# Patient Record
Sex: MALE | Race: WHITE | NOT HISPANIC OR LATINO | Employment: OTHER | URBAN - METROPOLITAN AREA
[De-identification: names, ages, dates, MRNs, and addresses within clinical notes are randomized per-mention and may not be internally consistent; named-entity substitution may affect disease eponyms.]

---

## 2021-02-22 ENCOUNTER — TRANSFERRED RECORDS (OUTPATIENT)
Dept: HEALTH INFORMATION MANAGEMENT | Facility: CLINIC | Age: 81
End: 2021-02-22

## 2021-03-24 ENCOUNTER — PREP FOR PROCEDURE (OUTPATIENT)
Dept: PULMONOLOGY | Facility: CLINIC | Age: 81
End: 2021-03-24

## 2021-03-24 ENCOUNTER — TELEPHONE (OUTPATIENT)
Dept: PULMONOLOGY | Facility: CLINIC | Age: 81
End: 2021-03-24

## 2021-03-24 DIAGNOSIS — R91.8 LUNG MASS: Primary | ICD-10-CM

## 2021-03-24 NOTE — TELEPHONE ENCOUNTER
Spoke with patient to schedule procedure with Jose Roberto Resendiz    Procedure was scheduled on 03/25 at [facility] OR  Patient will have H&P with [facility]     Patient is aware a COVID-19 test is needed before their procedure. The test should be with-in 4 days of their procedure.   Test Details: Date 03/24 Location [facility]     Patient is aware a / is needed day of surgery.   Patient has my direct contact information for any further questions.

## 2021-03-25 ENCOUNTER — HOSPITAL ENCOUNTER (OUTPATIENT)
Facility: CLINIC | Age: 81
Discharge: HOME | End: 2021-03-25
Attending: INTERNAL MEDICINE | Admitting: INTERNAL MEDICINE
Payer: COMMERCIAL

## 2021-03-25 ENCOUNTER — ANESTHESIA EVENT (OUTPATIENT)
Dept: SURGERY | Facility: CLINIC | Age: 81
End: 2021-03-25
Payer: COMMERCIAL

## 2021-03-25 ENCOUNTER — ANESTHESIA (OUTPATIENT)
Dept: SURGERY | Facility: CLINIC | Age: 81
End: 2021-03-25
Payer: COMMERCIAL

## 2021-03-25 VITALS
DIASTOLIC BLOOD PRESSURE: 81 MMHG | SYSTOLIC BLOOD PRESSURE: 122 MMHG | BODY MASS INDEX: 31.48 KG/M2 | RESPIRATION RATE: 16 BRPM | HEIGHT: 71 IN | OXYGEN SATURATION: 97 % | TEMPERATURE: 98.4 F | WEIGHT: 224.87 LBS | HEART RATE: 92 BPM

## 2021-03-25 DIAGNOSIS — R91.8 LUNG MASS: Primary | ICD-10-CM

## 2021-03-25 DIAGNOSIS — R91.8 LUNG MASS: ICD-10-CM

## 2021-03-25 LAB
ALBUMIN SERPL-MCNC: 3.6 G/DL (ref 3.4–5)
ALP SERPL-CCNC: 56 U/L (ref 40–150)
ALT SERPL W P-5'-P-CCNC: 25 U/L (ref 0–70)
AST SERPL W P-5'-P-CCNC: 16 U/L (ref 0–45)
BILIRUB DIRECT SERPL-MCNC: 0.2 MG/DL (ref 0–0.2)
BILIRUB SERPL-MCNC: 0.7 MG/DL (ref 0.2–1.3)
CREAT SERPL-MCNC: 0.91 MG/DL (ref 0.66–1.25)
ERYTHROCYTE [DISTWIDTH] IN BLOOD BY AUTOMATED COUNT: 13.8 % (ref 10–15)
GFR SERPL CREATININE-BSD FRML MDRD: 79 ML/MIN/{1.73_M2}
GLUCOSE BLDC GLUCOMTR-MCNC: 96 MG/DL (ref 70–99)
HCT VFR BLD AUTO: 41.2 % (ref 40–53)
HGB BLD-MCNC: 13 G/DL (ref 13.3–17.7)
INR PPP: 1.1 (ref 0.86–1.14)
INTERPRETATION ECG - MUSE: NORMAL
MCH RBC QN AUTO: 30.5 PG (ref 26.5–33)
MCHC RBC AUTO-ENTMCNC: 31.6 G/DL (ref 31.5–36.5)
MCV RBC AUTO: 97 FL (ref 78–100)
PLATELET # BLD AUTO: 148 10E9/L (ref 150–450)
POTASSIUM SERPL-SCNC: 4.2 MMOL/L (ref 3.4–5.3)
PROT SERPL-MCNC: 6.9 G/DL (ref 6.8–8.8)
RBC # BLD AUTO: 4.26 10E12/L (ref 4.4–5.9)
TSH SERPL DL<=0.005 MIU/L-ACNC: 2.73 MU/L (ref 0.4–4)
WBC # BLD AUTO: 5 10E9/L (ref 4–11)

## 2021-03-25 PROCEDURE — 85027 COMPLETE CBC AUTOMATED: CPT | Performed by: INTERNAL MEDICINE

## 2021-03-25 PROCEDURE — 36415 COLL VENOUS BLD VENIPUNCTURE: CPT | Performed by: INTERNAL MEDICINE

## 2021-03-25 PROCEDURE — 93005 ELECTROCARDIOGRAM TRACING: CPT

## 2021-03-25 PROCEDURE — 36415 COLL VENOUS BLD VENIPUNCTURE: CPT | Performed by: ANESTHESIOLOGY

## 2021-03-25 PROCEDURE — 999N000005 HC CANCELLED SURGERY UP TO 61-90 MINS: Performed by: INTERNAL MEDICINE

## 2021-03-25 PROCEDURE — 93010 ELECTROCARDIOGRAM REPORT: CPT | Mod: 59 | Performed by: INTERNAL MEDICINE

## 2021-03-25 PROCEDURE — 80076 HEPATIC FUNCTION PANEL: CPT | Performed by: INTERNAL MEDICINE

## 2021-03-25 PROCEDURE — 999N000141 HC STATISTIC PRE-PROCEDURE NURSING ASSESSMENT: Performed by: INTERNAL MEDICINE

## 2021-03-25 PROCEDURE — 82962 GLUCOSE BLOOD TEST: CPT

## 2021-03-25 PROCEDURE — 85610 PROTHROMBIN TIME: CPT | Performed by: ANESTHESIOLOGY

## 2021-03-25 PROCEDURE — 99222 1ST HOSP IP/OBS MODERATE 55: CPT | Mod: 25 | Performed by: INTERNAL MEDICINE

## 2021-03-25 PROCEDURE — 84443 ASSAY THYROID STIM HORMONE: CPT | Performed by: INTERNAL MEDICINE

## 2021-03-25 PROCEDURE — 84132 ASSAY OF SERUM POTASSIUM: CPT | Performed by: ANESTHESIOLOGY

## 2021-03-25 PROCEDURE — 82565 ASSAY OF CREATININE: CPT | Performed by: ANESTHESIOLOGY

## 2021-03-25 RX ORDER — CIPROFLOXACIN 250 MG/1
TABLET, FILM COATED ORAL
Status: ON HOLD | COMMUNITY
Start: 2020-11-16 | End: 2021-03-25

## 2021-03-25 RX ORDER — METOPROLOL TARTRATE 50 MG
75 TABLET ORAL
COMMUNITY
End: 2021-09-13

## 2021-03-25 RX ORDER — LIDOCAINE 50 MG/G
PATCH TOPICAL
Status: ON HOLD | COMMUNITY
Start: 2021-01-29 | End: 2023-01-01

## 2021-03-25 RX ORDER — SODIUM CHLORIDE, SODIUM LACTATE, POTASSIUM CHLORIDE, CALCIUM CHLORIDE 600; 310; 30; 20 MG/100ML; MG/100ML; MG/100ML; MG/100ML
INJECTION, SOLUTION INTRAVENOUS CONTINUOUS
Status: CANCELLED | OUTPATIENT
Start: 2021-03-25

## 2021-03-25 RX ORDER — CHLORAL HYDRATE 500 MG
1 CAPSULE ORAL DAILY
COMMUNITY

## 2021-03-25 RX ORDER — ONDANSETRON 2 MG/ML
4 INJECTION INTRAMUSCULAR; INTRAVENOUS EVERY 30 MIN PRN
Status: CANCELLED | OUTPATIENT
Start: 2021-03-25

## 2021-03-25 RX ORDER — OXYBUTYNIN CHLORIDE 5 MG/1
2.5 TABLET ORAL
COMMUNITY
Start: 2020-06-01 | End: 2021-09-13

## 2021-03-25 RX ORDER — NALOXONE HYDROCHLORIDE 0.4 MG/ML
0.4 INJECTION, SOLUTION INTRAMUSCULAR; INTRAVENOUS; SUBCUTANEOUS
Status: CANCELLED | OUTPATIENT
Start: 2021-03-25

## 2021-03-25 RX ORDER — ZINC GLUCONATE 50 MG
50 TABLET ORAL DAILY
COMMUNITY

## 2021-03-25 RX ORDER — NALOXONE HYDROCHLORIDE 0.4 MG/ML
0.2 INJECTION, SOLUTION INTRAMUSCULAR; INTRAVENOUS; SUBCUTANEOUS
Status: CANCELLED | OUTPATIENT
Start: 2021-03-25

## 2021-03-25 RX ORDER — LANOLIN ALCOHOL/MO/W.PET/CERES
400 CREAM (GRAM) TOPICAL DAILY
COMMUNITY

## 2021-03-25 RX ORDER — FOLIC ACID 0.8 MG
500 TABLET ORAL
Status: ON HOLD | COMMUNITY
End: 2023-01-01

## 2021-03-25 RX ORDER — OXYCODONE HYDROCHLORIDE 5 MG/1
TABLET ORAL
COMMUNITY
Start: 2021-03-12 | End: 2021-09-13

## 2021-03-25 RX ORDER — HYDROMORPHONE HYDROCHLORIDE 1 MG/ML
.3-.5 INJECTION, SOLUTION INTRAMUSCULAR; INTRAVENOUS; SUBCUTANEOUS EVERY 5 MIN PRN
Status: CANCELLED | OUTPATIENT
Start: 2021-03-25

## 2021-03-25 RX ORDER — ONDANSETRON 4 MG/1
4 TABLET, ORALLY DISINTEGRATING ORAL EVERY 30 MIN PRN
Status: CANCELLED | OUTPATIENT
Start: 2021-03-25

## 2021-03-25 RX ORDER — HYDRALAZINE HYDROCHLORIDE 20 MG/ML
2.5-5 INJECTION INTRAMUSCULAR; INTRAVENOUS EVERY 10 MIN PRN
Status: CANCELLED | OUTPATIENT
Start: 2021-03-25

## 2021-03-25 RX ORDER — MORPHINE SULFATE 15 MG/1
15 TABLET ORAL EVERY 4 HOURS PRN
Status: ON HOLD | COMMUNITY
End: 2021-03-31

## 2021-03-25 RX ORDER — SODIUM CHLORIDE, SODIUM LACTATE, POTASSIUM CHLORIDE, CALCIUM CHLORIDE 600; 310; 30; 20 MG/100ML; MG/100ML; MG/100ML; MG/100ML
INJECTION, SOLUTION INTRAVENOUS CONTINUOUS
Status: DISCONTINUED | OUTPATIENT
Start: 2021-03-25 | End: 2021-03-25 | Stop reason: HOSPADM

## 2021-03-25 RX ORDER — TAMSULOSIN HYDROCHLORIDE 0.4 MG/1
0.4 CAPSULE ORAL EVERY EVENING
COMMUNITY
Start: 2020-10-15

## 2021-03-25 RX ORDER — FENTANYL CITRATE 50 UG/ML
25-50 INJECTION, SOLUTION INTRAMUSCULAR; INTRAVENOUS
Status: CANCELLED | OUTPATIENT
Start: 2021-03-25

## 2021-03-25 RX ORDER — ALBUTEROL SULFATE 90 UG/1
2 AEROSOL, METERED RESPIRATORY (INHALATION)
COMMUNITY

## 2021-03-25 ASSESSMENT — ENCOUNTER SYMPTOMS: DYSRHYTHMIAS: 1

## 2021-03-25 ASSESSMENT — MIFFLIN-ST. JEOR: SCORE: 1747.13

## 2021-03-25 NOTE — OR NURSING
Dr Sariah Talbot -fellow from service in with patient with new onset atrial fib.and Dr Hyatt from anesthesia in to assess. Dr Hyatt is going to talk with Dr Mckeon and cardiology.

## 2021-03-25 NOTE — OR NURSING
Dr. FIDENCIO Rebollar (Cardiology)here with Pt and wife. She has reviewed all the lab results for labs that she had ordered.

## 2021-03-25 NOTE — OR NURSING
Dr. FIDENCIO Rebollar and Dr. SHAUNNA Herzog here to speak with Pt and wife. They have decided that Pt does not need the cardiac echo today.  Pt and wife understand that Dr. Resendiz's clinic will call them to reschedule.  Procedure scheduled for today was cancelled by anesthesia per Dr. Resendiz.  Face to face time 120 min.

## 2021-03-25 NOTE — PROGRESS NOTES
Case cancelled by anesthesia service due to new atrial fibrillation.    They have consulted cardiology.  Further cares in pre-op per cardiology and anesthesia service.    We will reschedule his procedure.    Jose Roberto Resendiz MD

## 2021-03-25 NOTE — OR NURSING
Pt took one oxycodone 5mg from his own supply with the approval of Dr. Diego.  Awaiting cardiology consult.

## 2021-03-25 NOTE — OR NURSING
Dr. Mcdonnell was informed of the decision by cardiology and that they have cleared Pt to be discharged to home. Pt and wife are comfortable with the information.

## 2021-03-25 NOTE — CONSULTS
Cardiology Consult                                                               2021  Mulugeta Castillo MRN: [id number]  Age: 80 year old, : 1940      Reason for consult:      Pre-operative, incidentally noted Afib (concern for new onset).      Assessment and Recommendation:     Mr Castillo is an 80 year old  with past medical history of hypertension, prior PE () on short term Coumadin, prior smoking, aortic root dilation (4.2 cm), chronic left chest wall pain attributed to pleurisy with recently diagnosed left hilar lung mass who presented today for an elective EBUS + biopsy of lung mass. Pre-operatively, patient noted to be in Afib on telemetry following which cardiology was consulted.     On review of records, patient noted to have a diagnosis of Afib in  ([facility]), however, patient unaware of such a diagnosis, no recurrent mention of Afib in subsequent follow ups. On assessment today, patient unaware of irregular rhythm, denied palpitations/SOB/presyncope. Rate controlled with HR /min, hemodynamically stable. Review of OSH TTE from Dec 2019 revealed normal LV function, trace MR, mildly enlarged LA.    # Paroxysmal Afib  # Hypertension  # Newly diagnosed left hilar mass    Plan:   Rate/rhythm control:   Patient on a regimen of metoprolol tartrate 75 mg BID, apparently for BP control. Good resultant rate control. Will not target rhythm control given long standing nature of paroxysmal Afib and asymptomatic state.    Anti-coagulation: CHADS VASc 3 (++age +HTN) translating into a ~3.2% annual risk of stroke. Risk vs benefit of AC discussed with the patient, decided to hold off for now given upcoming procedure and possible risk of bleeding thereafter w/ thrombocytopenia usually associated with chemoradiotherapy. Defer anti-coagulation initiation to PCP/oncologist once the treatment plan for lung mass is finalized.    - Repeat transthoracic echocardiogram pre-procedure not  warranted given long standing nature of Afib and recent TTE in Dec 2019 with preserved LV function/lack of valvular abnormalities. 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       Patient discussed with staff attending, Dr. Herzog and the note reflects our joint plan. Thank you for consulting the cardiovascular services at the [facility]. Please do not hesitate to call with questions or concerns.     Melissa Rebollar MD,   Cardiovascular Disease Fellow  Pager [phone]      History of Present Illness:     Mr Castillo is an 80 year old  with past medical history of hypertension, prior PE (2015) on short term Coumadin, prior smoking, aortic root dilation (4.2 cm), chronic left chest wall pain attributed to pleurisy with recently diagnosed left hilar lung mass who presented today for an elective EBUS + biopsy of lung mass. Pre-operatively, patient noted to be in Afib on telemetry following which cardiology was consulted.     On assessment, the patient denied hx of palpitations/SOB/MANZANO/ presyncope or syncope. Reported sharp left chest wall pain (reproducible with palpation) but denied angina. Denied PND, orthopnea, reported trace pedal edema which resolves with walking. Reported fatigue 2/2 poor sleep due to severe/sharp chest pain for which he recently started opioids.     On review of records, the patient was noted to have a diagnosis of Afib in 2008 ([facility]), however, he denied being aware of such a diagnosis. No recurrent mention of Afib in subsequent notes/follow ups. He denied being on anti-coagulation except short term coumadin for PE. Reported taking Lopressor 75 BID which was started about 5 years ago for BP control. He denied hx of GI bleeding, recurrent falls.     Vitals: HR /min, paroxysmal Afib. -120/70-80 mm Hg  Labs: Normal WBC,  HGB 13, PLT 148k, normal renal/liver function. Normal thyroid function. INR 1.1    Current cardiac medications: Lopressor 75 BID, ASA 81 mg daily.  Vitals:    03/25/21 1119   Weight: 102 kg (224 lb 13.9 oz)     A 13-point ROS is negative except as mentioned above     Meds:   omeprazole (PRILOSEC) 40 MG delayed-release capsule   Take 40 mg by mouth one time a day. Take before meals. Do not crush.   0     Active   Magnesium Oxide 500 MG Cap   Take 500 mg by mouth two times a day.   0     Active   OYSTER SHELL CALCIUM OR    Indications: 2 tabs in AM Take by mouth. Indications: 2 tabs in AM   0     Active   folic acid 400 MCG tablet   Take 400 mcg by mouth one time a day.   0     Active   vitamin B-6 (PYRIDOXINE) 100 MG tablet   Take 100 mg by mouth one time a day.   0     Active   Garlic 1000 MG Cap   Take by mouth.   0     Active   Omega-3 Fatty Acids (FISH OIL) 500 MG Cap   Take by mouth.   0     Active   vitamin E 400 UNIT capsule   Take 400 Units by mouth one time a day.   0     Active   aspirin 81 MG tablet   Take 81 mg by mouth one time a day.   0     Active   Zinc 50 MG Cap   Take 100 mg by mouth every morning.   0     Active   HYDROcodone-acetaminophen (NORCO) 5-325 MG oral tablet   Take 1-2 Tabs by mouth every four hours as needed for Pain. Limit acetaminophen to 4000 mg per day from all sources. 60 Tab   0 07/07/2015   Active   metoprolol tartrate (LOPRESSOR) 50 MG tablet   Take 1.5 Tabs by mouth two times a day. 30 Tab                Cardiac Tests:     TTE 12/31/2019:   1. Normal LV size, mildly increased wall thickness, normal global systolic function with an estimated EF of 70 - 75%.   2. Findings suggestive of possible acute PE. Reddy's sign noted.   3. Right ventricular cavity size is moderately enlarged, global systolic RV function is borderline reduced.   4. The aortic valve is calcified, no stenosis and no regurgitation.   5. The mitral valve is sclerotic, trace mitral regurgitation.   6. Moderate  tricuspid regurgitation.   7. Pulmonary artery is mildly dilated.   8. The inferior vena cava is dilated, respiratory size variation greater than 50%.   9. The ascending aorta is dilated with a maximal diameter of 5.0 cm.  10. Severely increased estimated pulmonary pressures by tricuspid regurgitation velocity and right atrial pressure (54 mmHg plus RAP).  11. The aortic sinus is dilated with a maximal diameter of 4.2 cm.  12. Mildly enlarged left atrium.    Lexiscan 1/20/2020:  1. Adequate pharmacologic stress test with regadenoson (Lexiscan).  2. The pharmacological stress ECG was negative for ischemia.  3. Myocardial perfusion was normal.   4. There was normal myocardial thickening and wall motion.  5. The resting LVEF was calculated to be 67%.   6. Left ventricular cavity size was normal  (resting  ml).  7. Compared to the prior study from 2/9/12, the previous inferior wall   reversibility is no longer seen.    CTA: 3/21/2021    FINDINGS: There is a good contrast bolus within the pulmonary arterial system.  There is no filling defect identified. There is a prominent left hilar mass with  extensive exhibits mild narrowing of the left upper lobe arterial vasculature.  There is atelectasis and/or scarring in the periphery of the lung multiple  lobes. There is a 4.1 x 4.9 cm left hilar mass noted. There is no acute  pulmonary disease. The trachea and proximal bronchi are patent. There is trace  left pleural fluid. There is no pericardial or right pleural effusion. There is  no thoracic adenopathy. There are gallstones in the gallbladder. There is a  small nonobstructing right renal collecting system stone. There are multiple  hypodensities within the liver likely representing cysts. There is no upper  abdominal adenopathy. There is arthritic change of the visualized skeleton.  There is no worrisome bone lesion.    IMPRESSION:  1.  Negative for acute pulmonary embolism.   2.  Left hilar lung mass.  3.   Cholelithiasis    STUDY:  Coronary CTA, 08/11/08  Coronary Anatomy:  (Right Dominant)    Left Main:  No stenoses.  Left Anterior Descending:  No stenoses.  First Diagonal:   No stenoses.  Second Diagonal:  Tiny, no stenoses.  Circumflex:  (Nondominant)  Intermediate Artery:  No stenoses.  First Obtuse Marginal:   No stenoses.  Right Coronary Artery:  Small but dominant.  No stenoses.  Posterior Descending Artery:   No stenoses.  Posterolateral Branch:   No stenoses.    Aorta:  Aortic sinus diameter 3.9 cm. Aortic valve is trileaflet. There   is preservation of the sinotubular ridge. The maximum ascending aortic   size is 5.0 x 5.1 cm.   Pericardium:  Normal thickness and without an effusion.       Past Medical History:     Patient Active Problem List   Diagnosis     Lung mass         Past Surgical History:      History reviewed. No pertinent surgical history.      Social History:     Social History     Socioeconomic History     Marital status:      Spouse name: Not on file     Number of children: Not on file     Years of education: Not on file     Highest education level: Not on file   Occupational History     Not on file   Social Needs     Financial resource strain: Not on file     Food insecurity     Worry: Not on file     Inability: Not on file     Transportation needs     Medical: Not on file     Non-medical: Not on file   Tobacco Use     Smoking status: Not on file   Substance and Sexual Activity     Alcohol use: Not on file     Drug use: Not on file     Sexual activity: Not on file   Lifestyle     Physical activity     Days per week: Not on file     Minutes per session: Not on file     Stress: Not on file   Relationships     Social connections     Talks on phone: Not on file     Gets together: Not on file     Attends [employer] service: Not on file     Active member of club or organization: Not on file     Attends meetings of clubs or organizations: Not on file     Relationship status: Not on file      Intimate partner violence     Fear of current or ex partner: Not on file     Emotionally abused: Not on file     Physically abused: Not on file     Forced sexual activity: Not on file   Other Topics Concern     Not on file   Social History Narrative     Not on file         Family History:     History reviewed. No pertinent family history.      Allergies:     Allergies   Allergen Reactions     Diltiazem Rash     Other reaction(s): Blistering rash  Skin peeling  Other reaction(s): Blistering rash  Skin peeling       Doxycycline Unknown and Rash     Rapid heart beat ????  Rapid heart beat ????       Tetracycline Rash         Medications:     No current facility-administered medications on file prior to encounter.   albuterol (PROAIR HFA/PROVENTIL HFA/VENTOLIN HFA) 108 (90 Base) MCG/ACT inhaler, Inhale 2 puffs into the lungs  Carboxymethylcellul-Glycerin 1-0.9 % GEL, Apply 1 drop to eye  lidocaine (LIDODERM) 5 % patch, APPLY 1 PATCH TO CHEST WALL EVERY DAY FOR PAIN RELIEF -WEAR FOR ONLY 12 HOURS THEN REMOVE AND LEAVE OFF FOR 12 HOURS  metoprolol tartrate (LOPRESSOR) 50 MG tablet, Take 75 mg by mouth  morphine (MSIR) 15 MG IR tablet, Take 15 mg by mouth every 4 hours as needed for severe pain  omeprazole (PRILOSEC) 20 MG DR capsule, Take 20 mg by mouth  oxybutynin (DITROPAN) 5 MG tablet, Take 2.5 mg by mouth  oxyCODONE (ROXICODONE) 5 MG tablet, TAKE ONE TABLET BY MOUTH EVERY 6 HOURS FOR PAIN  polyvinyl alcohol-povidone PF (REFRESH) 1.4-0.6 % ophthalmic solution, Apply 1 drop to eye  tamsulosin (FLOMAX) 0.4 MG capsule, TAKE ONE CAPSULE BY MOUTH EVERY DAY FOR BLADDER EMPTYING  zinc gluconate 50 MG tablet, Take 50 mg by mouth  Aspirin Buf,CaCarb-MgCarb-MgO, 81 MG TABS, Take 81 mg by mouth  Calcium Carb-Cholecalciferol 500-400 MG-UNT/5ML LIQD, Take 500 mg by mouth  folic acid (FOLVITE) 400 MCG tablet, Take 400 mcg by mouth  Garlic Oil 1000 MG CAPS,   Magnesium 500 MG CAPS, Take 500 mg by mouth  Omega-3 Fatty Acids (FISH OIL)  "500 MG CAPS,             Physical Exam:     /83 (BP Location: Left arm)   Pulse 85   Temp 98.4  F (36.9  C) (Oral)   Resp 14   Ht 1.803 m (5' 11\")   Wt 102 kg (224 lb 13.9 oz)   BMI 31.36 kg/m      Wt Readings from Last 4 Encounters:   03/25/21 102 kg (224 lb 13.9 oz)       No intake or output data in the 24 hours ending 03/25/21 1437    Gen: AA&Ox3, no acute distress  HEENT: EOM grossly intact, mucous membranes pink, moist without plaque or exudate  PULM/THORAX: Clear to auscultation bilaterally, no rales/rhonchi/wheezes. Severely tender left mid- chest wall.  CV: Irregular, S1 and S2 appreciated, no extra heart sounds, murmurs or rub auscultated. No JVD  ABD: obese, soft, nontender, nondistended. Normoactive bowel sounds  EXT: Trace B/L edema, clubbing or cyanosis.   PSYCH: appropriate affect and mentation.       Data:     Labs Reviewed on Admission    Troponin No results found for: TROPI, TROPONIN, TROPR, TROPN  BMP  Recent Labs   Lab 03/25/21  1138   POTASSIUM 4.2   CR 0.91     CBCNo lab results found in last 7 days.  INR  Recent Labs   Lab 03/25/21  1138   INR 1.10      Hepatic Panel No results found for: AST  No results found for: ALT  No results found for: BILICONJ   No results found for: BILITOTAL  No results found for: ALBUMIN  No results found for: PROTTOTAL   No results found for: ALKPHOS       "

## 2021-03-25 NOTE — ANESTHESIA PREPROCEDURE EVALUATION
Anesthesia Pre-Procedure Evaluation    Patient: Mulugeta Castillo   MRN: [id number] : 1940        Preoperative Diagnosis: Lung mass [R91.8]   Procedure : Procedure(s):  Veran BRONCHOSCOPY, USING OPTICAL TRACKING SYSTEM,  endobronchial ultrasound and transbronchial biopsies     No past medical history on file.   History reviewed. No pertinent surgical history.   Allergies   Allergen Reactions     Diltiazem Rash     Other reaction(s): Blistering rash  Skin peeling  Other reaction(s): Blistering rash  Skin peeling       Doxycycline Unknown and Rash     Rapid heart beat ????  Rapid heart beat ????       Tetracycline Rash      Social History     Tobacco Use     Smoking status: Not on file   Substance Use Topics     Alcohol use: Not on file      Wt Readings from Last 1 Encounters:   21 102 kg (224 lb 13.9 oz)        Anesthesia Evaluation   Pt has had prior anesthetic. Type: General.        ROS/MED HX  ENT/Pulmonary: Comment: Lung mass  Hx PE's      Neurologic:       Cardiovascular: Comment: EKG - Afib    Aortic aneurysm     (+) hypertension-----dysrhythmias, a-fib,     METS/Exercise Tolerance:     Hematologic:       Musculoskeletal:       GI/Hepatic:     (+) GERD,     Renal/Genitourinary:       Endo:       Psychiatric/Substance Use:       Infectious Disease:       Malignancy:   (+) Malignancy, History of Prostate.    Other:               OUTSIDE LABS:  CBC: No results found for: WBC, HGB, HCT, PLT  BMP:   Lab Results   Component Value Date    POTASSIUM 4.2 2021    CR 0.91 2021     COAGS:   Lab Results   Component Value Date    INR 1.10 2021     POC:   Lab Results   Component Value Date    BGM 96 2021     HEPATIC: No results found for: ALBUMIN, PROTTOTAL, ALT, AST, GGT, ALKPHOS, BILITOTAL, BILIDIRECT, BALAJI  OTHER: No results found for: PH, LACT, A1C, NUPUR, PHOS, MAG, LIPASE, AMYLASE, TSH, T4, T3, CRP, SED    Anesthesia Plan    ASA Status:  3      Anesthesia Type: General.     - Airway: ETT    Induction: Intravenous.   Maintenance: TIVA.        Consents    Anesthesia Plan(s) and associated risks, benefits, and realistic alternatives discussed. Questions answered and patient/representative(s) expressed understanding.     - Discussed with:  Patient      - Extended Intubation/Ventilatory Support Discussed: No.      - Patient is DNR/DNI Status: No    Use of blood products discussed: No .     Postoperative Care    Pain management: IV analgesics, Oral pain medications.   PONV prophylaxis: Ondansetron (or other 5HT-3), Dexamethasone or Solumedrol     Comments:                Aj Hyatt MD

## 2021-03-26 ENCOUNTER — TELEPHONE (OUTPATIENT)
Dept: PULMONOLOGY | Facility: CLINIC | Age: 81
End: 2021-03-26

## 2021-03-26 DIAGNOSIS — Z11.59 ENCOUNTER FOR SCREENING FOR OTHER VIRAL DISEASES: ICD-10-CM

## 2021-03-26 NOTE — TELEPHONE ENCOUNTER
Spoke with patient and wife to schedule procedure with Dr. Hema Barajas   Procedure was scheduled on 03/31 at [facility] OR  [facility]  Patient had cardiac consult on 03/25 as well     Patient is aware a COVID-19 test is needed before their procedure. The test should be with-in 4 days of their procedure.   Test Details: Date 03/29  Location [facility]    Patient is aware a / is needed day of surgery.   Patient has my direct contact information for any further questions.

## 2021-03-29 ENCOUNTER — TRANSFERRED RECORDS (OUTPATIENT)
Dept: HEALTH INFORMATION MANAGEMENT | Facility: CLINIC | Age: 81
End: 2021-03-29

## 2021-03-30 ENCOUNTER — TRANSFERRED RECORDS (OUTPATIENT)
Dept: HEALTH INFORMATION MANAGEMENT | Facility: CLINIC | Age: 81
End: 2021-03-30

## 2021-03-31 ENCOUNTER — ANESTHESIA EVENT (OUTPATIENT)
Dept: SURGERY | Facility: CLINIC | Age: 81
End: 2021-03-31
Payer: COMMERCIAL

## 2021-03-31 ENCOUNTER — APPOINTMENT (OUTPATIENT)
Dept: GENERAL RADIOLOGY | Facility: CLINIC | Age: 81
End: 2021-03-31
Attending: INTERNAL MEDICINE
Payer: COMMERCIAL

## 2021-03-31 ENCOUNTER — HOSPITAL ENCOUNTER (OUTPATIENT)
Facility: CLINIC | Age: 81
Discharge: HOME | End: 2021-03-31
Attending: INTERNAL MEDICINE | Admitting: INTERNAL MEDICINE
Payer: COMMERCIAL

## 2021-03-31 ENCOUNTER — ANESTHESIA (OUTPATIENT)
Dept: SURGERY | Facility: CLINIC | Age: 81
End: 2021-03-31
Payer: COMMERCIAL

## 2021-03-31 ENCOUNTER — HOSPITAL ENCOUNTER (OUTPATIENT)
Dept: CT IMAGING | Facility: CLINIC | Age: 81
End: 2021-03-31
Attending: STUDENT IN AN ORGANIZED HEALTH CARE EDUCATION/TRAINING PROGRAM | Admitting: INTERNAL MEDICINE
Payer: COMMERCIAL

## 2021-03-31 VITALS
OXYGEN SATURATION: 94 % | HEIGHT: 71 IN | TEMPERATURE: 98 F | HEART RATE: 88 BPM | SYSTOLIC BLOOD PRESSURE: 108 MMHG | BODY MASS INDEX: 30.49 KG/M2 | RESPIRATION RATE: 18 BRPM | DIASTOLIC BLOOD PRESSURE: 75 MMHG | WEIGHT: 217.81 LBS

## 2021-03-31 DIAGNOSIS — R91.8 LUNG MASS: ICD-10-CM

## 2021-03-31 LAB — GLUCOSE BLDC GLUCOMTR-MCNC: 113 MG/DL (ref 70–99)

## 2021-03-31 PROCEDURE — 88341 IMHCHEM/IMCYTCHM EA ADD ANTB: CPT | Mod: TC | Performed by: INTERNAL MEDICINE

## 2021-03-31 PROCEDURE — 88305 TISSUE EXAM BY PATHOLOGIST: CPT | Mod: 26 | Performed by: PATHOLOGY

## 2021-03-31 PROCEDURE — 250N000009 HC RX 250: Performed by: NURSE ANESTHETIST, CERTIFIED REGISTERED

## 2021-03-31 PROCEDURE — 88342 IMHCHEM/IMCYTCHM 1ST ANTB: CPT | Mod: 26 | Performed by: PATHOLOGY

## 2021-03-31 PROCEDURE — 999N001018 HC STATISTIC H-CELL BLOCK W/STAIN: Performed by: INTERNAL MEDICINE

## 2021-03-31 PROCEDURE — 88341 IMHCHEM/IMCYTCHM EA ADD ANTB: CPT | Mod: 26 | Performed by: PATHOLOGY

## 2021-03-31 PROCEDURE — 88173 CYTOPATH EVAL FNA REPORT: CPT | Mod: TC | Performed by: INTERNAL MEDICINE

## 2021-03-31 PROCEDURE — 272N000001 HC OR GENERAL SUPPLY STERILE: Performed by: INTERNAL MEDICINE

## 2021-03-31 PROCEDURE — 93005 ELECTROCARDIOGRAM TRACING: CPT | Mod: XU

## 2021-03-31 PROCEDURE — 258N000003 HC RX IP 258 OP 636

## 2021-03-31 PROCEDURE — 250N000011 HC RX IP 250 OP 636

## 2021-03-31 PROCEDURE — 88172 CYTP DX EVAL FNA 1ST EA SITE: CPT | Mod: TC | Performed by: INTERNAL MEDICINE

## 2021-03-31 PROCEDURE — 71250 CT THORAX DX C-: CPT

## 2021-03-31 PROCEDURE — 71045 X-RAY EXAM CHEST 1 VIEW: CPT | Mod: 26 | Performed by: RADIOLOGY

## 2021-03-31 PROCEDURE — 88342 IMHCHEM/IMCYTCHM 1ST ANTB: CPT | Mod: TC | Performed by: INTERNAL MEDICINE

## 2021-03-31 PROCEDURE — 250N000011 HC RX IP 250 OP 636: Performed by: ANESTHESIOLOGY

## 2021-03-31 PROCEDURE — 999N000065 XR CHEST PORT 1 VW

## 2021-03-31 PROCEDURE — 250N000009 HC RX 250

## 2021-03-31 PROCEDURE — 88173 CYTOPATH EVAL FNA REPORT: CPT | Mod: 26 | Performed by: PATHOLOGY

## 2021-03-31 PROCEDURE — 258N000003 HC RX IP 258 OP 636: Performed by: NURSE ANESTHETIST, CERTIFIED REGISTERED

## 2021-03-31 PROCEDURE — 370N000017 HC ANESTHESIA TECHNICAL FEE, PER MIN: Performed by: INTERNAL MEDICINE

## 2021-03-31 PROCEDURE — 88305 TISSUE EXAM BY PATHOLOGIST: CPT | Mod: TC | Performed by: INTERNAL MEDICINE

## 2021-03-31 PROCEDURE — 82962 GLUCOSE BLOOD TEST: CPT

## 2021-03-31 PROCEDURE — 999N000141 HC STATISTIC PRE-PROCEDURE NURSING ASSESSMENT: Performed by: INTERNAL MEDICINE

## 2021-03-31 PROCEDURE — 88172 CYTP DX EVAL FNA 1ST EA SITE: CPT | Mod: 26 | Performed by: PATHOLOGY

## 2021-03-31 PROCEDURE — 250N000009 HC RX 250: Performed by: INTERNAL MEDICINE

## 2021-03-31 PROCEDURE — 360N000076 HC SURGERY LEVEL 3, PER MIN: Performed by: INTERNAL MEDICINE

## 2021-03-31 PROCEDURE — 250N000013 HC RX MED GY IP 250 OP 250 PS 637: Performed by: ANESTHESIOLOGY

## 2021-03-31 PROCEDURE — 250N000011 HC RX IP 250 OP 636: Performed by: STUDENT IN AN ORGANIZED HEALTH CARE EDUCATION/TRAINING PROGRAM

## 2021-03-31 PROCEDURE — 710N000012 HC RECOVERY PHASE 2, PER MINUTE: Performed by: INTERNAL MEDICINE

## 2021-03-31 PROCEDURE — 71250 CT THORAX DX C-: CPT | Mod: 26 | Performed by: RADIOLOGY

## 2021-03-31 PROCEDURE — 710N000010 HC RECOVERY PHASE 1, LEVEL 2, PER MIN: Performed by: INTERNAL MEDICINE

## 2021-03-31 RX ORDER — ONDANSETRON 4 MG/1
4 TABLET, ORALLY DISINTEGRATING ORAL EVERY 30 MIN PRN
Status: DISCONTINUED | OUTPATIENT
Start: 2021-03-31 | End: 2021-03-31 | Stop reason: HOSPADM

## 2021-03-31 RX ORDER — IBUPROFEN 200 MG
400 TABLET ORAL EVERY 6 HOURS PRN
COMMUNITY
End: 2021-09-01

## 2021-03-31 RX ORDER — ONDANSETRON 2 MG/ML
4 INJECTION INTRAMUSCULAR; INTRAVENOUS EVERY 30 MIN PRN
Status: DISCONTINUED | OUTPATIENT
Start: 2021-03-31 | End: 2021-03-31 | Stop reason: HOSPADM

## 2021-03-31 RX ORDER — OXYCODONE HYDROCHLORIDE 5 MG/1
5 TABLET ORAL EVERY 4 HOURS PRN
Status: DISCONTINUED | OUTPATIENT
Start: 2021-03-31 | End: 2021-03-31 | Stop reason: HOSPADM

## 2021-03-31 RX ORDER — NALOXONE HYDROCHLORIDE 0.4 MG/ML
0.4 INJECTION, SOLUTION INTRAMUSCULAR; INTRAVENOUS; SUBCUTANEOUS
Status: DISCONTINUED | OUTPATIENT
Start: 2021-03-31 | End: 2021-03-31 | Stop reason: HOSPADM

## 2021-03-31 RX ORDER — METOPROLOL TARTRATE 1 MG/ML
INJECTION, SOLUTION INTRAVENOUS PRN
Status: DISCONTINUED | OUTPATIENT
Start: 2021-03-31 | End: 2021-04-01

## 2021-03-31 RX ORDER — PROPOFOL 10 MG/ML
INJECTION, EMULSION INTRAVENOUS PRN
Status: DISCONTINUED | OUTPATIENT
Start: 2021-03-31 | End: 2021-04-01

## 2021-03-31 RX ORDER — LIDOCAINE HYDROCHLORIDE 20 MG/ML
INJECTION, SOLUTION INFILTRATION; PERINEURAL PRN
Status: DISCONTINUED | OUTPATIENT
Start: 2021-03-31 | End: 2021-04-01

## 2021-03-31 RX ORDER — FENTANYL CITRATE 50 UG/ML
50 INJECTION, SOLUTION INTRAMUSCULAR; INTRAVENOUS ONCE
Status: COMPLETED | OUTPATIENT
Start: 2021-03-31 | End: 2021-03-31

## 2021-03-31 RX ORDER — HYDROMORPHONE HYDROCHLORIDE 1 MG/ML
.3-.5 INJECTION, SOLUTION INTRAMUSCULAR; INTRAVENOUS; SUBCUTANEOUS EVERY 5 MIN PRN
Status: DISCONTINUED | OUTPATIENT
Start: 2021-03-31 | End: 2021-03-31 | Stop reason: HOSPADM

## 2021-03-31 RX ORDER — ESMOLOL HYDROCHLORIDE 10 MG/ML
INJECTION INTRAVENOUS PRN
Status: DISCONTINUED | OUTPATIENT
Start: 2021-03-31 | End: 2021-04-01

## 2021-03-31 RX ORDER — SODIUM CHLORIDE, SODIUM LACTATE, POTASSIUM CHLORIDE, CALCIUM CHLORIDE 600; 310; 30; 20 MG/100ML; MG/100ML; MG/100ML; MG/100ML
INJECTION, SOLUTION INTRAVENOUS CONTINUOUS
Status: DISCONTINUED | OUTPATIENT
Start: 2021-03-31 | End: 2021-03-31

## 2021-03-31 RX ORDER — ONDANSETRON 2 MG/ML
INJECTION INTRAMUSCULAR; INTRAVENOUS PRN
Status: DISCONTINUED | OUTPATIENT
Start: 2021-03-31 | End: 2021-04-01

## 2021-03-31 RX ORDER — SODIUM CHLORIDE, SODIUM LACTATE, POTASSIUM CHLORIDE, CALCIUM CHLORIDE 600; 310; 30; 20 MG/100ML; MG/100ML; MG/100ML; MG/100ML
INJECTION, SOLUTION INTRAVENOUS CONTINUOUS PRN
Status: DISCONTINUED | OUTPATIENT
Start: 2021-03-31 | End: 2021-04-01

## 2021-03-31 RX ORDER — FENTANYL CITRATE 50 UG/ML
INJECTION, SOLUTION INTRAMUSCULAR; INTRAVENOUS PRN
Status: DISCONTINUED | OUTPATIENT
Start: 2021-03-31 | End: 2021-04-01

## 2021-03-31 RX ORDER — PROPOFOL 10 MG/ML
INJECTION, EMULSION INTRAVENOUS CONTINUOUS PRN
Status: DISCONTINUED | OUTPATIENT
Start: 2021-03-31 | End: 2021-04-01

## 2021-03-31 RX ORDER — SODIUM CHLORIDE, SODIUM LACTATE, POTASSIUM CHLORIDE, CALCIUM CHLORIDE 600; 310; 30; 20 MG/100ML; MG/100ML; MG/100ML; MG/100ML
INJECTION, SOLUTION INTRAVENOUS CONTINUOUS
Status: DISCONTINUED | OUTPATIENT
Start: 2021-03-31 | End: 2021-03-31 | Stop reason: HOSPADM

## 2021-03-31 RX ORDER — FENTANYL CITRATE 50 UG/ML
25-50 INJECTION, SOLUTION INTRAMUSCULAR; INTRAVENOUS
Status: DISCONTINUED | OUTPATIENT
Start: 2021-03-31 | End: 2021-03-31 | Stop reason: HOSPADM

## 2021-03-31 RX ORDER — NALOXONE HYDROCHLORIDE 0.4 MG/ML
0.2 INJECTION, SOLUTION INTRAMUSCULAR; INTRAVENOUS; SUBCUTANEOUS
Status: DISCONTINUED | OUTPATIENT
Start: 2021-03-31 | End: 2021-03-31 | Stop reason: HOSPADM

## 2021-03-31 RX ORDER — DEXAMETHASONE SODIUM PHOSPHATE 10 MG/ML
INJECTION, SOLUTION INTRAMUSCULAR; INTRAVENOUS PRN
Status: DISCONTINUED | OUTPATIENT
Start: 2021-03-31 | End: 2021-04-01

## 2021-03-31 RX ORDER — HYDRALAZINE HYDROCHLORIDE 20 MG/ML
2.5-5 INJECTION INTRAMUSCULAR; INTRAVENOUS EVERY 10 MIN PRN
Status: DISCONTINUED | OUTPATIENT
Start: 2021-03-31 | End: 2021-03-31 | Stop reason: HOSPADM

## 2021-03-31 RX ADMIN — PHENYLEPHRINE HYDROCHLORIDE 100 MCG: 10 INJECTION INTRAVENOUS at 16:37

## 2021-03-31 RX ADMIN — PHENYLEPHRINE HYDROCHLORIDE 100 MCG: 10 INJECTION INTRAVENOUS at 17:16

## 2021-03-31 RX ADMIN — FENTANYL CITRATE 100 MCG: 50 INJECTION, SOLUTION INTRAMUSCULAR; INTRAVENOUS at 16:11

## 2021-03-31 RX ADMIN — OXYCODONE HYDROCHLORIDE 5 MG: 5 TABLET ORAL at 19:51

## 2021-03-31 RX ADMIN — SUGAMMADEX 200 MG: 100 INJECTION, SOLUTION INTRAVENOUS at 17:57

## 2021-03-31 RX ADMIN — FENTANYL CITRATE 50 MCG: 50 INJECTION, SOLUTION INTRAMUSCULAR; INTRAVENOUS at 14:07

## 2021-03-31 RX ADMIN — HYDROMORPHONE HYDROCHLORIDE 0.2 MG: 1 INJECTION, SOLUTION INTRAMUSCULAR; INTRAVENOUS; SUBCUTANEOUS at 18:45

## 2021-03-31 RX ADMIN — HYDROMORPHONE HYDROCHLORIDE 0.5 MG: 1 INJECTION, SOLUTION INTRAMUSCULAR; INTRAVENOUS; SUBCUTANEOUS at 19:06

## 2021-03-31 RX ADMIN — PROPOFOL 150 MCG/KG/MIN: 10 INJECTION, EMULSION INTRAVENOUS at 16:14

## 2021-03-31 RX ADMIN — PHENYLEPHRINE HYDROCHLORIDE 100 MCG: 10 INJECTION INTRAVENOUS at 16:47

## 2021-03-31 RX ADMIN — METOPROLOL TARTRATE 3 MG: 5 INJECTION INTRAVENOUS at 16:27

## 2021-03-31 RX ADMIN — FENTANYL CITRATE 50 MCG: 50 INJECTION, SOLUTION INTRAMUSCULAR; INTRAVENOUS at 18:48

## 2021-03-31 RX ADMIN — PHENYLEPHRINE HYDROCHLORIDE 100 MCG: 10 INJECTION INTRAVENOUS at 17:43

## 2021-03-31 RX ADMIN — ESMOLOL HYDROCHLORIDE 70 MG: 10 INJECTION, SOLUTION INTRAVENOUS at 16:20

## 2021-03-31 RX ADMIN — LIDOCAINE HYDROCHLORIDE 100 MG: 20 INJECTION, SOLUTION INFILTRATION; PERINEURAL at 16:11

## 2021-03-31 RX ADMIN — ROCURONIUM BROMIDE 20 MG: 10 INJECTION INTRAVENOUS at 17:00

## 2021-03-31 RX ADMIN — PHENYLEPHRINE HYDROCHLORIDE 100 MCG: 10 INJECTION INTRAVENOUS at 16:30

## 2021-03-31 RX ADMIN — DEXAMETHASONE SODIUM PHOSPHATE 6 MG: 10 INJECTION, SOLUTION INTRAMUSCULAR; INTRAVENOUS at 16:11

## 2021-03-31 RX ADMIN — SODIUM CHLORIDE, POTASSIUM CHLORIDE, SODIUM LACTATE AND CALCIUM CHLORIDE: 600; 310; 30; 20 INJECTION, SOLUTION INTRAVENOUS at 17:18

## 2021-03-31 RX ADMIN — ROCURONIUM BROMIDE 20 MG: 10 INJECTION INTRAVENOUS at 16:56

## 2021-03-31 RX ADMIN — ESMOLOL HYDROCHLORIDE 30 MG: 10 INJECTION, SOLUTION INTRAVENOUS at 16:26

## 2021-03-31 RX ADMIN — METOPROLOL TARTRATE 1 MG: 5 INJECTION INTRAVENOUS at 16:45

## 2021-03-31 RX ADMIN — FENTANYL CITRATE 50 MCG: 50 INJECTION, SOLUTION INTRAMUSCULAR; INTRAVENOUS at 18:58

## 2021-03-31 RX ADMIN — FENTANYL CITRATE 50 MCG: 50 INJECTION, SOLUTION INTRAMUSCULAR; INTRAVENOUS at 14:56

## 2021-03-31 RX ADMIN — PROPOFOL 20 MG: 10 INJECTION, EMULSION INTRAVENOUS at 17:52

## 2021-03-31 RX ADMIN — REMIFENTANIL HYDROCHLORIDE 0.1 MCG/KG/MIN: 1 INJECTION, POWDER, LYOPHILIZED, FOR SOLUTION INTRAVENOUS at 16:56

## 2021-03-31 RX ADMIN — PHENYLEPHRINE HYDROCHLORIDE 100 MCG: 10 INJECTION INTRAVENOUS at 17:09

## 2021-03-31 RX ADMIN — PHENYLEPHRINE HYDROCHLORIDE 100 MCG: 10 INJECTION INTRAVENOUS at 16:20

## 2021-03-31 RX ADMIN — PHENYLEPHRINE HYDROCHLORIDE 100 MCG: 10 INJECTION INTRAVENOUS at 16:17

## 2021-03-31 RX ADMIN — ROCURONIUM BROMIDE 50 MG: 10 INJECTION INTRAVENOUS at 16:11

## 2021-03-31 RX ADMIN — FENTANYL CITRATE 50 MCG: 50 INJECTION, SOLUTION INTRAMUSCULAR; INTRAVENOUS at 18:09

## 2021-03-31 RX ADMIN — PROPOFOL 180 MG: 10 INJECTION, EMULSION INTRAVENOUS at 16:11

## 2021-03-31 RX ADMIN — HYDROMORPHONE HYDROCHLORIDE 0.3 MG: 1 INJECTION, SOLUTION INTRAMUSCULAR; INTRAVENOUS; SUBCUTANEOUS at 18:34

## 2021-03-31 RX ADMIN — SODIUM CHLORIDE, POTASSIUM CHLORIDE, SODIUM LACTATE AND CALCIUM CHLORIDE: 600; 310; 30; 20 INJECTION, SOLUTION INTRAVENOUS at 16:03

## 2021-03-31 RX ADMIN — ONDANSETRON 4 MG: 2 INJECTION INTRAMUSCULAR; INTRAVENOUS at 17:46

## 2021-03-31 RX ADMIN — METOPROLOL TARTRATE 2 MG: 5 INJECTION INTRAVENOUS at 16:28

## 2021-03-31 ASSESSMENT — MIFFLIN-ST. JEOR: SCORE: 1715.13

## 2021-03-31 ASSESSMENT — ENCOUNTER SYMPTOMS: DYSRHYTHMIAS: 1

## 2021-03-31 NOTE — BRIEF OP NOTE
[facility] Brief Operative Note    Pre-operative diagnosis: Lung mass [R91.8]   Post-operative diagnosis * No post-op diagnosis entered *   Procedure: Procedure(s):  Flexible Bronchoscopy, Airway Exam, Therapeutic Suctioning, Esophagogastroduodenoscopy   Surgeon: Guru Donnell MD   Assistants(s): Hema Barajas   Estimated blood loss: None    Specimens: None   Findings:      EUS    5 cm lung mass seen from esophagus. EUS FNB with 22 G (4 passes)    Liver cysts seen    No adrenal mets    Recommendations    CXR before discharge

## 2021-03-31 NOTE — ANESTHESIA PREPROCEDURE EVALUATION
Anesthesia Pre-Procedure Evaluation    Patient: Mulugeta Castillo   MRN: [id number] : 1940        Preoperative Diagnosis: Lung mass [R91.8]   Procedure : Procedure(s):  BRONCHOSCOPY with navigation using Peeractive optical tracking system,  Endobronchial ultrasound with transbronchial biopsies     No past medical history on file.   History reviewed. No pertinent surgical history.   Allergies   Allergen Reactions     Diltiazem Rash     Other reaction(s): Blistering rash  Skin peeling  Other reaction(s): Blistering rash  Skin peeling       Doxycycline Unknown and Rash     Rapid heart beat ????  Rapid heart beat ????       Tetracycline Rash      Social History     Tobacco Use     Smoking status: Not on file   Substance Use Topics     Alcohol use: Not on file      Wt Readings from Last 1 Encounters:   21 98.8 kg (217 lb 13 oz)        Anesthesia Evaluation            ROS/MED HX  ENT/Pulmonary: Comment: L Lung mass  Pain along L chest wall      Neurologic:  - neg neurologic ROS     Cardiovascular: Comment: Case originally scheduled for , cancelled due to concern over new onset a fib.  Patient denies PMH of dysrythmias, seen by cardiology here, ECG read as SR with frequent PACs, per patient was told he was cleared for surgery however due to scheduling, case was cancelled and rescheduled for today, 21  [facility]  and additionally cleared for procedure, no further testing.  ECHO has been recommended, however to be scheduled after this procedure    Thoracic aortic aneurysm - no change in size, pt reports followed annually    (+) Dyslipidemia hypertension-----dysrhythmias,     METS/Exercise Tolerance:     Hematologic:  - neg hematologic  ROS     Musculoskeletal:  - neg musculoskeletal ROS     GI/Hepatic:  - neg GI/hepatic ROS     Renal/Genitourinary:  - neg Renal ROS     Endo:  - neg endo ROS     Psychiatric/Substance Use:     (+) H/O chronic opiod use .     Infectious  Disease:  - neg infectious disease ROS     Malignancy:   (+) Malignancy, History of Other.Other CA bladder status post.    Other:  - neg other ROS          Physical Exam    Airway  airway exam normal      Mallampati: II   TM distance: > 3 FB   Neck ROM: full   Mouth opening: > 3 cm    Respiratory Devices and Support         Dental  no notable dental history         Cardiovascular          Rhythm and rate: irregular     Pulmonary   pulmonary exam normal        breath sounds clear to auscultation           OUTSIDE LABS:  CBC:   Lab Results   Component Value Date    WBC 5.0 03/25/2021    HGB 13.0 (L) 03/25/2021    HCT 41.2 03/25/2021     (L) 03/25/2021     BMP:   Lab Results   Component Value Date    POTASSIUM 4.2 03/25/2021    CR 0.91 03/25/2021     COAGS:   Lab Results   Component Value Date    INR 1.10 03/25/2021     POC:   Lab Results   Component Value Date     (H) 03/31/2021     HEPATIC:   Lab Results   Component Value Date    ALBUMIN 3.6 03/25/2021    PROTTOTAL 6.9 03/25/2021    ALT 25 03/25/2021    AST 16 03/25/2021    ALKPHOS 56 03/25/2021    BILITOTAL 0.7 03/25/2021     OTHER:   Lab Results   Component Value Date    TSH 2.73 03/25/2021       Anesthesia Plan    ASA Status:  3   NPO Status:  NPO Appropriate    Anesthesia Type: General.     - Airway: ETT   Induction: Intravenous, Propofol.   Maintenance: TIVA.        Consents    Anesthesia Plan(s) and associated risks, benefits, and realistic alternatives discussed. Questions answered and patient/representative(s) expressed understanding.     - Discussed with:  Patient      - Extended Intubation/Ventilatory Support Discussed: No.      - Patient is DNR/DNI Status: No    Use of blood products discussed: No .     Postoperative Care    Pain management: IV analgesics, Multi-modal analgesia.   PONV prophylaxis: Ondansetron (or other 5HT-3), Background Propofol Infusion     Comments:                Antonino Rogers MD

## 2021-03-31 NOTE — PROCEDURES
INTERVENTIONAL PULMONOLOGY       Procedure(s):    A flexible bronchoscopy  Airway exam  EBUS and EUS-B   Therapeutic suctioning (1 sites)    Indication:  Lung mass    Attending of Record:  Hema Barajas MD     Interventional Pulmonary Fellow   None    Trainees Present:   None     Medications:    General Anesthesia - See anesthesia flowsheet for details    Sedation Time:   Per Anesthesia Care Provider    Time Out:  Performed    The patient's medical record has been reviewed.  The indication for the procedure was reviewed.  The necessary history and physical examination was performed and reviewed.  The risks, benefits and alternatives of the procedure were discussed with the the patient in detail and he had the opportunity to ask questions.  I discussed in particular the potential complications including risks of minor or life-threatening bleeding and/or infection, respiratory failure, vocal cord trauma / paralysis, pneumothorax, and discomfort. Sedation risks were also discussed including abnormal heart rhythms, low blood pressure, and respiratory failure. All questions were answered to the best of my ability.  Verbal and written informed consent was obtained.  The proposed procedure and the patient's identification were verified prior to the procedure by the physician and the surgical team.    The patient was assessed for the adequacy for the procedure and to receive medications.   Mental Status:  Alert and oriented x 3  Airway examination:  Class I (Complete visualization of soft palate)  Pulmonary:  Clear to ausculation bilaterally  CV:  RRR, no murmurs or gallops  ASA Grade:  (I)  Normal healthy patient    After clinical evaluation and reviewing the indication, risks, alternatives and benefits of the procedure the patient was deemed to be in satisfactory condition to undergo the procedure.      A Tuberculosis risk assessment was performed:  The patient has no known RISK of Tuberculosis    The procedure was  performed in a negative airflow room: The patient could not be moved to a negative airflow room because of needed OR for the procedure    Maneuvers / Procedure:      A Flexible  bronchoscope was used for the procedure. The patient was orally intubated with a # 8.5 ETT and the flexible scope was passed through.    Airway Examination: A complete airway examination was performed from the distal trachea to the subsegmental level in each lobe of both lungs.  Pertinent findings include no endobronchial lesion.       EBUS and EUS-B: The EBUS scope was inserted and station#5 lesion was not clearly visualized. The scope was then removed from the ETT and advanced into the esophagus. The station5 lesion was seen however could not find a safe needle path due to the aortic arch.   Therapeutic suctioning: 15-20min of operative time was spent clearing out the airway of debris, blood and mucous prior to the intervention.     Any disposable equipment was visually inspected and deemed to be intact immediately post procedure.      Relevant Pictures    EUS-B image of station#5 lesion      Recommendations:     -->  Successful flex bronch, airway exam and therapeutic suctioning  -->  Station#5 was seen on EUS-B however there was no safe window to pass the needle given its approximation to the aortic arch  -->  GI will proceed now with EUS for station#5 in addition to mediastinal staging       Hema Barajas MD, MHA    of Medicine  Interventional Pulmonary  Department of Pulmonary, Allergy, Critical Care and Sleep Medicine   [facility]  Pager: [phone]   Office: [phone]  Email: [email or web address]    Cherri SALCEDO, OCN  Clinical Nurse Specialist  Department of Thoracic Surgery  Office: [phone]  Email: [email or web address]    Yazmin Leon  Interventional Pulmonology Surgery Scheduler  Office: [phone]  Email: [email or web address]

## 2021-03-31 NOTE — ANESTHESIA CARE TRANSFER NOTE
Patient: Mulugeta Castillo    Procedure(s):  Flexible Bronchoscopy, Airway Exam, Therapeutic Suctioning, Esophagogastroduodenoscopy    Diagnosis: Lung mass [R91.8]  Diagnosis Additional Information: No value filed.    Anesthesia Type:   General     Note:    Oropharynx: oropharynx clear of all foreign objects and spontaneously breathing  Level of Consciousness: awake  Oxygen Supplementation: face mask    Independent Airway: airway patency satisfactory and stable  Dentition: dentition unchanged  Vital Signs Stable: post-procedure vital signs reviewed and stable  Report to RN Given: handoff report given  Patient transferred to: PACU  Comments: Patient transferred to PACU. VSS. Denies pain, denies nausea. Report given to receiving RN.  Handoff Report: Identifed the Patient, Identified the Reponsible Provider, Reviewed the pertinent medical history, Discussed the surgical course, Reviewed Intra-OP anesthesia mangement and issues during anesthesia, Set expectations for post-procedure period and Allowed opportunity for questions and acknowledgement of understanding      Vitals: (Last set prior to Anesthesia Care Transfer)  CRNA VITALS  3/31/2021 1739 - 3/31/2021 1812      3/31/2021             Pulse:  86    SpO2:  100 %    Resp Rate (observed): 12        Electronically Signed By: ADELA Flores CRNA  March 31, 2021  6:12 PM

## 2021-03-31 NOTE — OR NURSING
Patient reporting 9/10 generalized pain. Takes oral morphine and oxycodone regularly at home. Dr. Rogers notified, per [facility], ok to give 50 mcg Fentanyl IV.

## 2021-03-31 NOTE — ANESTHESIA PROCEDURE NOTES
Airway       Patient location during procedure: OR       Procedure Start/Stop Times: 3/31/2021 4:14 PM and 3/31/2021 4:16 PM  Staff -        Other Anesthesia Staff: Chrissy Gan       Performed By: SRNA  Consent for Airway        Urgency: elective  Indications and Patient Condition       Indications for airway management: charles-procedural       Induction type:intravenous       Mask difficulty assessment: 2 - vent by mask + OA or adjuvant +/- NMBA    Final Airway Details       Final airway type: endotracheal airway       Successful airway: ETT - single  Endotracheal Airway Details        Tube size (mm): 9.0.       Cuffed: yes       Successful intubation technique: direct laryngoscopy       DL Blade Type: MAC 2       Grade View of Cords: 2       Adjucts: stylet       Position: Right       Measured from: gums/teeth       Secured at (cm): 24       Bite block used: None    Post intubation assessment        Placement verified by: capnometry, equal breath sounds and chest rise        Number of attempts at approach: 1       Number of other approaches attempted: 0       Secured with: silk tape       Ease of procedure: easy       Dentition: Intact and Unchanged    Medication(s) Administered   Medication Administration Time: 3/31/2021 4:14 PM

## 2021-04-01 NOTE — ANESTHESIA POSTPROCEDURE EVALUATION
Patient: Mulugeta Castlilo    Procedure(s):  Flexible Bronchoscopy, Airway Exam, Therapeutic Suctioning, Esophagogastroduodenoscopy    Diagnosis:Lung mass [R91.8]  Diagnosis Additional Information: No value filed.    Anesthesia Type:  General    Note:  Disposition: Outpatient; Inpatient   Postop Pain Control: Uneventful            Sign Out: Well controlled pain   PONV: No   Neuro/Psych: Uneventful            Sign Out: Acceptable/Baseline neuro status   Airway/Respiratory: Uneventful            Sign Out: Acceptable/Baseline resp. status   CV/Hemodynamics: Uneventful            Sign Out: Acceptable CV status   Other NRE: NONE   DID A NON-ROUTINE EVENT OCCUR? No         Last vitals:  Vitals:    03/31/21 2000 03/31/21 2015 03/31/21 2030   BP: (!) 138/97 108/75    Pulse: 105 88    Resp: 19 18    Temp:  36.7  C (98  F)    SpO2: 90% 91% 94%       Last vitals prior to Anesthesia Care Transfer:  CRNA VITALS  3/31/2021 1739 - 3/31/2021 1839      3/31/2021             Pulse:  86    SpO2:  100 %    Resp Rate (observed):  (!) 1          Electronically Signed By: Hugo Serrano DO  March 31, 2021  9:55 PM

## 2021-04-01 NOTE — OR NURSING
Dr. Serrano at bedside in pacu. Reviewed telemetry readings. Okay for discharge home. Will place sign out when available.

## 2021-04-01 NOTE — OR NURSING
Discharge instructions provided and reviewed with Leyda (spouse) and Cayden (son), designated caregiver. Printed after visit summary sent home with patient. Understanding verbalized.  Ok per Dr Serrano to give 1x dose of home oxy      I also briefly mentioned the option of Mulugeta having a pulse oximeter at home to check his SpO2 levels when short of breath, etc. I explained that questions or concerns should be brought to Dr Barajas or primary care MD.

## 2021-04-01 NOTE — DISCHARGE INSTRUCTIONS
BRONCHOSCOPY Procedure  Adult Discharge Orders & Instructions     For 24 hours after BRONCHOSCOPY     1. You may cough up a small amount of blood for a day or two  2. Get plenty of rest.  A responsible adult must stay with you for at least 24 hours after you leave the hospital.   3. Do not drive or use heavy equipment.  If you have weakness or tingling, don't drive or use heavy equipment until this feeling goes away.  4. Do not drink alcohol.  5. Avoid strenuous or risky activities (gym, yoga, cycling, etc.).  Ask for help when climbing stairs.   6. You may feel lightheaded.  IF so, sit for a few minutes before standing.  Have someone help you get up.   7. If you have nausea (feel sick to your stomach): Drink only clear liquids such as apple juice, ginger ale, broth or 7-Up.  Rest may also help.  Be sure to drink enough fluids.  Move to a regular diet as you feel able.  8. You may have a slight fever. Call the doctor if your fever is over 100 F (37.7 C) (taken under the tongue) or lasts longer than 24 hours.  9. You may have a dry mouth, a sore throat, muscle aches or trouble sleeping.  These are normal and should go away after 24 hours.  10. Do not make important or legal decisions.     Call your doctor  for any of the followin. If you begin to cough up bright red blood, or large clots of blood   2. If you become increasing more short of breath or begin to have chest pains  3. Difficulty swallowing or feeling as though food or liquids are getting stuck   4. Sore throat lasting more than 2 days or pain that has worsened over time  5. Nausea and/or vomiting that is not resolving or has not responded to anti-nausea medications if prescribed to you   6. If you experience a fever above 100.5 F (38 C) for more than 24 hours.   7. It has been over 8 to 10 hours since surgery and you are still not able to urinate (pass water).    To contact a doctor, call:  [ ] Dr. Barajas's clinic at [phone] (Monday thru  Friday 8:00am to 4:30pm)  [ ] [phone] and ask for the PULMONARY MEDICINE resident on call (answered 24 hours a day)  [ ] Emergency Department: [facility]: [phone]  Take it easy when you get home.  Remember, same day surgery DOES NOT MEAN SAME DAY RECOVERY!  Healing is a gradual process.  You will need some time to recover - you may be more tired than you realize at first.  Rest and relax for at least the first 24 hours at home.  You'll feel better and heal faster if you take good care of yourself.    Esophagogastroduodenoscopy (EGD) Procedure  Adult Discharge Orders & Instructions  You had a procedure known as an Esophagogastroduodenoscopy (EGD) of either the upper gastrointestinal (GI) tract. An UPPER EGD will place a camera into either your mouth or nose to examine your esophagus, stomach, and/or small intestines. Biopsies, small samples of tissue, are often taken to help diagnose and/or classify stages of disease growth. The EGD is also used to help locate areas of the GI tract that may require further treatment (dilation, stenting, clipping, removal, etc.) or medical interventions (medication specific).      Call your doctor for any of the followin. Chest pain, and/or shortness of breath  2. Abdominal  pain, bloating or cramping that has not improved or does not respond to pain reliving medications (Tylenol or narcotics if prescribed)   3. Difficulty swallowing or feeling as though food or liquids are stuck in your throat   4. Sore throat lasting more than 2 days or pain that has worsened over time   5. Black or tarry stools   6. Nausea and/or vomiting that is not resolving or has not responded to anti-nausea medications prescribed to you   7. It has been over 8 to 10 hours since surgery and you are still not able to urinate (pass water)   8. Headache for over 24 hours   9. Fever over 100.5 F (38 C) lasting more than 24 hours after the procedure   10. Signs of jaundice or blockage (fever,  chills, abdominal pain, yellowing of the whites of your eyes, yellowing of your skin, and/or passing darker than normal urine)   To contact a doctor, call:   [ ] Dr. Guru Jacobo @ [phone] (GI Clinic) (Monday thru Friday 8:00am to 4:30pm)   [ ] [phone] and ask for the Gastroenterology resident on call (answered 24 hours a day)

## 2021-04-02 LAB — INTERPRETATION ECG - MUSE: NORMAL

## 2021-04-03 LAB — UPPER EUS: NORMAL

## 2021-04-05 LAB — COPATH REPORT: NORMAL

## 2021-07-15 ENCOUNTER — TRANSFERRED RECORDS (OUTPATIENT)
Dept: HEALTH INFORMATION MANAGEMENT | Facility: CLINIC | Age: 81
End: 2021-07-15

## 2021-08-09 ENCOUNTER — TRANSFERRED RECORDS (OUTPATIENT)
Dept: HEALTH INFORMATION MANAGEMENT | Facility: CLINIC | Age: 81
End: 2021-08-09

## 2021-08-19 ENCOUNTER — TRANSCRIBE ORDERS (OUTPATIENT)
Dept: OTHER | Age: 81
End: 2021-08-19

## 2021-08-19 DIAGNOSIS — C71.9 MALIGNANT NEOPLASM OF BRAIN (H): Primary | ICD-10-CM

## 2021-08-23 ENCOUNTER — TRANSFERRED RECORDS (OUTPATIENT)
Dept: HEALTH INFORMATION MANAGEMENT | Facility: CLINIC | Age: 81
End: 2021-08-23

## 2021-08-23 ENCOUNTER — TELEPHONE (OUTPATIENT)
Dept: NEUROSURGERY | Facility: CLINIC | Age: 81
End: 2021-08-23

## 2021-08-23 NOTE — TELEPHONE ENCOUNTER
Writer ARIELA for pt to call back and schedule appt regarding neurosurgery referral.    Please schedule a virtual visit with Dr. Martinez for the next available on a Friday. To schedule with Dr. Martinez use Dept  [facility] ADULT NEUROSRG [[id number]]    Terra Davidson

## 2021-08-25 ENCOUNTER — PRE VISIT (OUTPATIENT)
Dept: NEUROSURGERY | Facility: CLINIC | Age: 81
End: 2021-08-25

## 2021-08-25 NOTE — TELEPHONE ENCOUNTER
Action 2:30PM  8/25/21   Action Taken [facility] at [phone] asking for all notes, records, and imaging to be sent.    Writer emailed progress notes attached to referral to HIM dept to add to pt chart      Action 9:30AM  8/30/21   Action Taken Writer confirmed we have received head CT and MRI in PACS.

## 2021-08-31 DIAGNOSIS — C79.31 METASTASIS TO BRAIN (H): Primary | ICD-10-CM

## 2021-09-01 ENCOUNTER — PRE VISIT (OUTPATIENT)
Dept: RADIATION ONCOLOGY | Facility: CLINIC | Age: 81
End: 2021-09-01

## 2021-09-01 ENCOUNTER — OFFICE VISIT (OUTPATIENT)
Dept: RADIATION ONCOLOGY | Facility: CLINIC | Age: 81
End: 2021-09-01
Attending: RADIOLOGY
Payer: COMMERCIAL

## 2021-09-01 VITALS
HEART RATE: 72 BPM | DIASTOLIC BLOOD PRESSURE: 65 MMHG | RESPIRATION RATE: 20 BRPM | SYSTOLIC BLOOD PRESSURE: 117 MMHG | OXYGEN SATURATION: 100 %

## 2021-09-01 DIAGNOSIS — C79.31 METASTASIS TO BRAIN (H): Primary | ICD-10-CM

## 2021-09-01 LAB
ANION GAP SERPL CALCULATED.3IONS-SCNC: 1 MMOL/L (ref 3–14)
BASOPHILS # BLD AUTO: 0 10E3/UL (ref 0–0.2)
BASOPHILS NFR BLD AUTO: 2 %
CHLORIDE BLD-SCNC: 102 MMOL/L (ref 94–109)
CO2 SERPL-SCNC: 31 MMOL/L (ref 20–32)
CREAT SERPL-MCNC: 0.79 MG/DL (ref 0.66–1.25)
EOSINOPHIL # BLD AUTO: 0 10E3/UL (ref 0–0.7)
EOSINOPHIL NFR BLD AUTO: 2 %
ERYTHROCYTE [DISTWIDTH] IN BLOOD BY AUTOMATED COUNT: 12.4 % (ref 10–15)
GFR SERPL CREATININE-BSD FRML MDRD: 84 ML/MIN/1.73M2
HCT VFR BLD AUTO: 31.5 % (ref 40–53)
HGB BLD-MCNC: 10.2 G/DL (ref 13.3–17.7)
IMM GRANULOCYTES # BLD: 0 10E3/UL
IMM GRANULOCYTES NFR BLD: 0 %
LYMPHOCYTES # BLD AUTO: 0.2 10E3/UL (ref 0.8–5.3)
LYMPHOCYTES NFR BLD AUTO: 17 %
MCH RBC QN AUTO: 33.4 PG (ref 26.5–33)
MCHC RBC AUTO-ENTMCNC: 32.4 G/DL (ref 31.5–36.5)
MCV RBC AUTO: 103 FL (ref 78–100)
MONOCYTES # BLD AUTO: 0.1 10E3/UL (ref 0–1.3)
MONOCYTES NFR BLD AUTO: 7 %
NEUTROPHILS # BLD AUTO: 0.8 10E3/UL (ref 1.6–8.3)
NEUTROPHILS NFR BLD AUTO: 72 %
NRBC # BLD AUTO: 0 10E3/UL
NRBC BLD AUTO-RTO: 0 /100
PLATELET # BLD AUTO: 73 10E3/UL (ref 150–450)
POTASSIUM BLD-SCNC: 4.2 MMOL/L (ref 3.4–5.3)
RBC # BLD AUTO: 3.05 10E6/UL (ref 4.4–5.9)
SODIUM SERPL-SCNC: 134 MMOL/L (ref 133–144)
WBC # BLD AUTO: 1.2 10E3/UL (ref 4–11)

## 2021-09-01 PROCEDURE — 85025 COMPLETE CBC W/AUTO DIFF WBC: CPT | Performed by: RADIOLOGY

## 2021-09-01 PROCEDURE — G0463 HOSPITAL OUTPT CLINIC VISIT: HCPCS | Performed by: RADIOLOGY

## 2021-09-01 PROCEDURE — 82565 ASSAY OF CREATININE: CPT | Performed by: RADIOLOGY

## 2021-09-01 PROCEDURE — 82374 ASSAY BLOOD CARBON DIOXIDE: CPT | Performed by: RADIOLOGY

## 2021-09-01 PROCEDURE — 36415 COLL VENOUS BLD VENIPUNCTURE: CPT | Performed by: RADIOLOGY

## 2021-09-01 ASSESSMENT — ENCOUNTER SYMPTOMS
DIZZINESS: 0
NEUROLOGICAL NEGATIVE: 1
INSOMNIA: 1
HEADACHES: 0
GASTROINTESTINAL NEGATIVE: 1
FREQUENCY: 1
COUGH: 1
TINGLING: 0
SEIZURES: 0
SPUTUM PRODUCTION: 1
WEAKNESS: 0
SHORTNESS OF BREATH: 1

## 2021-09-01 NOTE — PROGRESS NOTES
Department of Radiation Oncology  [address] [address]  [phone]       Consultation Note    Name: Mulugeta Castillo MRN: [id number]   : 1940   Date of Service: Sep 1, 2021 Referring: Dr. 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    History of Present Illness   Mr. Castillo is a 80 year old  with PMHx significant for papillary urothelial carcinoma status post TURBT on 2020 as well as a diagnosis of neuroendocrine tumor of the lung on 2021. He was treated with definitive chemoradiation with weekly Taxol/carboplatin (2021-2021). He developed a left pleural effusion and underwent pleurocentesis with fluid cytology was positive for metastatic neuroendocrine carcinoma.  Radiation was stopped at a total dose of 4000 cGy.     He then completed four cycles of nivolumab monotherapy (2021-2021). 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  PET/CT on 2021 demonstrated evidence of disease progression with intense hypermetabolism with a pleural-based mass associated with left upper lobe medially extending into the left hilar lymph node as well as multiple new small foci of pleural-based hypermetabolism occupying the left hemothorax consistent with prostatic disease.    He was seen by [facility]. He was started on carboplatin/etoposide on 2020.  He was referred to us for management of his brain lesions.    On interview today, he was accompanied by his wife.  He stated that he has been doing well so far.  He denied any recent fever, chills, headaches, nausea, vomiting,  weakness, numbness, visual or auditory changes, shortness of breath, chest pain, hemoptysis, weight or appetite changes.  He denied any side effects related to his most recent chemotherapy. He reported chronic left flank pain radiating up to the shoulder for which he takes pain medications including Tylenol and oxycodone with good control.    Past Medical History:  Aortic aneurysm  Coronary ablation  Lung cancer    Past Surgical History:  Left inguinal hernia repair      Chemotherapy History:  Per HPI    Radiation History:  Definitive chemoradiation (4/2021-5/2021)  stopped at  4000 cGy   due to  progressive disease     Implanted Cardiac Devices: No    Medications:  Aspirin  Calcium  Folic acid  Metoprolol  Omeprazole  Albuterol  Oxybutynin  Magnesium  Vitamin B6    Allergies:  Diltiazem  Doxycycline    Social History:  Tobacco: former smoker   Alcohol: Yes    Family History:  Lung cancer: sister     Review of Systems   A 10-point review of systems was performed. Pertinent findings are noted in the HPI.    Physical Exam   ECOG Status: 1    Vitals:  /65   Pulse 72   Resp 20   SpO2 100%   Gen: Alert, in NAD  Head: NC/AT  Eyes: PERRL, EOMI, sclera anicteric  Ears: No external auricular lesions  Nose/sinus: No rhinorrhea or epistaxis  Oral cavity/oropharynx: MMM, no visible oral cavity lesions, FOM and BOT are soft to palpation  Neck: Full ROM, supple, no palpable adenopathy  Pulm: No wheezing, stridor or respiratory distress  CV: Extremities are warm and well-perfused, no cyanosis, no pedal edema  Skin: Normal color and turgor  Neuro: A/Ox3, CN II-XII intact, normal gait    Imaging   Imaging: MRI brain 7/15/2021            Assessment    Mr. 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  His most recent MRI of the brain demonstrated 3 lesions consistent with intracranial metastasis.  He has no neurologic deficits.    Plan   We  recommend a course of GK SRS for treatment of his intracranial metastases aiming at optimal local control.  The treatment will be delivered in one fraction using the mask approach.  We discussed the rationale, logistics, alternatives and potential side effects associated with the treatment.  We also discussed the follow-up plan after completion of treatment.    His treatment is tentatively scheduled on 9/15/2021.  We would like to obtain a brain MRI shortly before the treatment for detecton of any new intracranial lesions and help with treatment planning.  At end of discussion, the patient agreed with the plan and signed the treatment consent.  After completion of treatment, [facility]    The patient and his wife had many questions during our conversation that were answered to their satisfaction and verbalized understanding.     The patient was seen and discussed with staff, Dr. Miner.    Heron Sanchez MD  PGY-5 Resident, Radiation Oncology  [facility]  Phone:[phone]  Pager:[phone]    I was personally present with the resident during the history and exam.  I   discussed the case with the resident and agree with the findings and plan   of care as documented in the resident's note.    Netta Miner   [phone]     CC  Patient Care Team:  Roopa Hong PTA as PCP - General (Physical Therapy Asst)  STEVE CASTORENA

## 2021-09-01 NOTE — PROGRESS NOTES
HPI      Review of Systems   Constitutional: Positive for malaise/fatigue.        Energy fair to poor   HENT: Positive for hearing loss and tinnitus.         Hearing aides bilat. Hearing good with aides in.  Minimal tinnitus   Eyes:        Glaucoma and cataracts; sees eye doctor routinely   Respiratory: Positive for cough, sputum production and shortness of breath.         Productive cough; light white  SOB occasional   Cardiovascular: Positive for leg swelling.        Leg swelling 'comes and goes'  Elevates as needed; wears support socks   Gastrointestinal: Negative.    Genitourinary: Positive for frequency.        At night up every 2 hours or so.   Musculoskeletal:        Left flank pain that radiates to upper left back under shoulder. At time, pain shoots, from upper left back down; can take his breath away. Pain medication helps.    Skin: Negative.    Neurological: Negative.  Negative for dizziness, tingling, seizures, weakness and headaches.   Endo/Heme/Allergies: Negative.    Psychiatric/Behavioral: The patient has insomnia.

## 2021-09-01 NOTE — LETTER
2021         RE: Mulugeta Castillo  [address] [facility] [address]        Dear Colleague,    Thank you for referring your patient, Mulugeta Castillo, to the [facility] RADIATION ONCOLOGY GAMMA KNIFE. Please see a copy of my visit note below.       Department of Radiation Oncology  [facility]  [address] [place] [address]  [phone]       Consultation Note    Name: Mulugeta Castillo MRN: [id number]   : 1940   Date of Service: Sep 1, 2021 Referring: 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    History of Present Illness   Mr. Castillo is a 80 year old  with PMHx significant for papillary urothelial carcinoma status post TURBT on 2020 as well as a diagnosis of neuroendocrine tumor of the lung on 2021. He was treated with definitive chemoradiation with weekly Taxol/carboplatin (2021-2021). He developed a left pleural effusion and underwent pleurocentesis with fluid cytology was positive for metastatic neuroendocrine carcinoma.  Radiation was stopped at a total dose of 4000 cGy.     He then completed four cycles of nivolumab monotherapy (2021-2021). 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  PET/CT on 2021 demonstrated evidence of disease progression with intense hypermetabolism with a pleural-based mass associated with left upper lobe medially extending into the left hilar lymph node as well as multiple new small foci of pleural-based hypermetabolism occupying the left hemothorax consistent with prostatic disease.    [facility]. He was started on  carboplatin/etoposide on 8/23/2020.  He was referred to us for management of his brain lesions.    On interview today, he was accompanied by his wife.  He stated that he has been doing well so far.  He denied any recent fever, chills, headaches, nausea, vomiting, weakness, numbness, visual or auditory changes, shortness of breath, chest pain, hemoptysis, weight or appetite changes.  He denied any side effects related to his most recent chemotherapy. He reported chronic left flank pain radiating up to the shoulder for which he takes pain medications including Tylenol and oxycodone with good control.    Past Medical History:  Aortic aneurysm  Coronary ablation  Lung cancer    Past Surgical History:  Left inguinal hernia repair      Chemotherapy History:  Per HPI    Radiation History:  Definitive chemoradiation (4/2021-5/2021)  stopped at  4000 cGy   due to  progressive disease     Implanted Cardiac Devices: No    Medications:  Aspirin  Calcium  Folic acid  Metoprolol  Omeprazole  Albuterol  Oxybutynin  Magnesium  Vitamin B6    Allergies:  Diltiazem  Doxycycline    Social History:  Tobacco: former smoker   Alcohol: Yes    Family History:  Lung cancer: sister     Review of Systems   A 10-point review of systems was performed. Pertinent findings are noted in the HPI.    Physical Exam   ECOG Status: 1    Vitals:  /65   Pulse 72   Resp 20   SpO2 100%   Gen: Alert, in NAD  Head: NC/AT  Eyes: PERRL, EOMI, sclera anicteric  Ears: No external auricular lesions  Nose/sinus: No rhinorrhea or epistaxis  Oral cavity/oropharynx: MMM, no visible oral cavity lesions, FOM and BOT are soft to palpation  Neck: Full ROM, supple, no palpable adenopathy  Pulm: No wheezing, stridor or respiratory distress  CV: Extremities are warm and well-perfused, no cyanosis, no pedal edema  Skin: Normal color and turgor  Neuro: A/Ox3, CN II-XII intact, normal gait    Imaging   Imaging: MRI brain 7/15/2021            Assessment    Mr. 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  His most recent MRI of the brain demonstrated 3 lesions consistent with intracranial metastasis.  He has no neurologic deficits.    Plan   We recommend a course of GK SRS for treatment of his intracranial metastases aiming at optimal local control.  The treatment will be delivered in one fraction using the mask approach.  We discussed the rationale, logistics, alternatives and potential side effects associated with the treatment.  We also discussed the follow-up plan after completion of treatment.    His treatment is tentatively scheduled on 9/15/2021.  We would like to obtain a brain MRI shortly before the treatment for detecton of any new intracranial lesions and help with treatment planning.  At end of discussion, the patient agreed with the plan and signed the treatment consent.  After completion of treatment, [facility]    The patient and his wife had many questions during our conversation that were answered to their satisfaction and verbalized understanding.     The patient was seen and discussed with staff, Dr. Miner.    Heron Sanchez MD  PGY-5 Resident, Radiation Oncology  [facility]  Phone:[phone]  Pager:[phone]    I was personally present with the resident during the history and exam.  I   discussed the case with the resident and agree with the findings and plan   of care as documented in the resident's note.    Netta Miner   [phone]     CC  Patient Care Team:  Roopa Hong PTA as PCP - General (Physical Therapy Asst)  STEVE CASTORENA

## 2021-09-01 NOTE — NURSING NOTE
Date: 2021   Age: 80 year old  Ethnicity:    Sex: male  : 1940   Lives In: [place]      Diagnosis: left lung cancer    Prior radiation therapy:   Site Treated: L lung and mediastinum  Facility: [facility]  Dates: 21- 21  Dose: 4000 cGy  in 20 fractions    Prior chemotherapy:   See below    Pain at time of consult?  Does patient have a living will?  Does patient have an implanted cardiac device?    RN time with patient:  Educated on gamma knife?    Fall Screen:  Have you fallen in the past week?   Have you felt unsteady when walking or standing in the past week?     Physicians:   Review Since Diagnosis:    10/8/20: Bladder, TURST-papillary urothelial carcinoma, high grade, non-invasive.  Tissue sampled: mucosa and lamina propria    20: Bladder, tumor base, TURBT re-stage; biopsy of sites; no evidence of malignancy    3/31/21: lung mass positive for malignancy. Neuroendocrine tumor with some high grade features.    2021- 2021: ChemoXRT with weekly carbo/taxol     21: left pleural fluid cytology demonstrated a metastatic neuroendocrine carcinoma    21: radiation therapy completed with concurrent chemotherapy left lung and mediastinum.     21: C1 Nivo   Ipi    21  C2 Nivo    21 C3 Nivo   Ipi     7/15/21: Brain MRI noted. Three lesions identified. Punctate left basal ganglia 4mm in diameter; inferior left occipital lobe 1.3mm; pineal gland 12.8mm AP diameter.    21   C4  Nivo      21: PET scan related to worsening pain in left upper extremity and chest. Intense hypermetabolism is seen associated with a pleural-based mass associated with the left upper lobe medially extending into the left hilar lymph nodes.Multiple new small foci of pleural-based hypermetabolism left hemithorax compatible with metastases. Impression: metabolic progression of disease.     [facility]: [facility]) Review of scans. New metastatic progression  post chemoradiation (carbo  Taxol) and immunotherapy. Suggested changing therapy to carbo  Etoposide. Consent signed. Referring to [facility] for [facility]. [facility]    8/23/21:  C1  Carbo Etoposide at 100%     Chief Complaint: at

## 2021-09-03 ENCOUNTER — TELEPHONE (OUTPATIENT)
Dept: NEUROSURGERY | Facility: CLINIC | Age: 81
End: 2021-09-03

## 2021-09-03 ENCOUNTER — VIRTUAL VISIT (OUTPATIENT)
Dept: NEUROSURGERY | Facility: CLINIC | Age: 81
End: 2021-09-03
Attending: OCCUPATIONAL THERAPIST
Payer: COMMERCIAL

## 2021-09-03 DIAGNOSIS — C79.31 BRAIN METASTASIS: Primary | ICD-10-CM

## 2021-09-03 PROCEDURE — 99204 OFFICE O/P NEW MOD 45 MIN: CPT | Mod: TEL | Performed by: NEUROLOGICAL SURGERY

## 2021-09-03 NOTE — LETTER
9/3/2021         RE: Mulugeta Castillo  [address] [address]        Dear Colleague,    Thank you for referring your patient, Mulugeta Castillo, to the [facility]. Please see a copy of my visit note below.    Mulugeta is a 80 year old who is being evaluated via a billable telephone visit.      What phone number would you like to be contacted at? [phone]  How would you like to obtain your AVS? 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 The patient presents for consideration of surgery versus SRS.    No new neurological symptoms on review of systems.    Past Medical History:   Diagnosis Date     Lung cancer (H)      Metastasis to brain (H)        Current Outpatient Medications:      albuterol (PROAIR HFA/PROVENTIL HFA/VENTOLIN HFA) 108 (90 Base) MCG/ACT inhaler, Inhale 2 puffs into the lungs, Disp: , Rfl:      Aspirin Buf,CaCarb-MgCarb-MgO, 81 MG TABS, Take 81 mg by mouth, Disp: , Rfl:      Calcium Carb-Cholecalciferol 500-400 MG-UNT/5ML LIQD, Take 500 mg by mouth, Disp: , Rfl:      folic acid (FOLVITE) 400 MCG tablet, Take 400 mcg by mouth, Disp: , Rfl:      Garlic Oil 1000 MG CAPS, , Disp: , Rfl:      lidocaine (LIDODERM) 5 % patch, APPLY 1 PATCH TO CHEST WALL EVERY DAY FOR PAIN RELIEF -WEAR FOR ONLY 12 HOURS THEN REMOVE AND LEAVE OFF FOR 12 HOURS, Disp: , Rfl:      Magnesium 500 MG CAPS, Take 500 mg by mouth, Disp: , Rfl:      metoprolol tartrate (LOPRESSOR) 50 MG tablet, Take 75 mg by mouth, Disp: , Rfl:      Omega-3 Fatty Acids (FISH OIL) 500 MG CAPS, , Disp: , Rfl:      omeprazole (PRILOSEC) 20 MG DR capsule, Take 20 mg by mouth, Disp: , Rfl:      oxybutynin (DITROPAN) 5 MG tablet, Take 2.5 mg by mouth, Disp: , Rfl:      oxyCODONE (ROXICODONE) 5 MG tablet, TAKE ONE TABLET BY MOUTH EVERY 6 HOURS FOR PAIN,  Disp: , Rfl:      polyvinyl alcohol-povidone PF (REFRESH) 1.4-0.6 % ophthalmic solution, Apply 1 drop to eye, Disp: , Rfl:      tamsulosin (FLOMAX) 0.4 MG capsule, TAKE ONE CAPSULE BY MOUTH EVERY DAY FOR BLADDER EMPTYING, Disp: , Rfl:      zinc gluconate 50 MG tablet, Take 50 mg by mouth, Disp: , Rfl:     Allergies   Allergen Reactions     Diltiazem Rash     Other reaction(s): Blistering rash  Skin peeling  Other reaction(s): Blistering rash  Skin peeling       Eliquis [Apixaban]      Nose bleeds and rash     Doxycycline Unknown and Rash     Rapid heart beat ????  Rapid heart beat ????       Tetracycline Rash     Allergies   Allergen Reactions     Diltiazem Rash     Other reaction(s): Blistering rash  Skin peeling  Other reaction(s): Blistering rash  Skin peeling       Eliquis [Apixaban]      Nose bleeds and rash     Doxycycline Unknown and Rash     Rapid heart beat ????  Rapid heart beat ????       Tetracycline Rash     On the phone call, the patient reports no neurologic deficit    I had reviewed the MRI from 7/15/2021    AP: 80 M with stage IV neuroendocrine lung CA with three brain metastasis.I had reviewed this case with Dr. Miner. Given the age of the patient and the location/size of the lesions, I think that the patient would most benefit from SRS, rather than surgical resection. Rationale, risks, and benefits of the procedure was reviewed with the patient. Informed consent obtained. I will move forward to [facility] to schedule the SRS.      Again, thank you for allowing me to participate in the care of your patient.        Sincerely,        Pankaj Martinez MD

## 2021-09-03 NOTE — PROGRESS NOTES
Mulugeta is a 80 year old who is being evaluated via a billable telephone visit.      What phone number would you like to be contacted at? [phone]  How would you like to obtain your AVS? 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 The patient presents for consideration of surgery versus SRS.    No new neurological symptoms on review of systems.    Past Medical History:   Diagnosis Date     Lung cancer (H)      Metastasis to brain (H)        Current Outpatient Medications:      albuterol (PROAIR HFA/PROVENTIL HFA/VENTOLIN HFA) 108 (90 Base) MCG/ACT inhaler, Inhale 2 puffs into the lungs, Disp: , Rfl:      Aspirin Buf,CaCarb-MgCarb-MgO, 81 MG TABS, Take 81 mg by mouth, Disp: , Rfl:      Calcium Carb-Cholecalciferol 500-400 MG-UNT/5ML LIQD, Take 500 mg by mouth, Disp: , Rfl:      folic acid (FOLVITE) 400 MCG tablet, Take 400 mcg by mouth, Disp: , Rfl:      Garlic Oil 1000 MG CAPS, , Disp: , Rfl:      lidocaine (LIDODERM) 5 % patch, APPLY 1 PATCH TO CHEST WALL EVERY DAY FOR PAIN RELIEF -WEAR FOR ONLY 12 HOURS THEN REMOVE AND LEAVE OFF FOR 12 HOURS, Disp: , Rfl:      Magnesium 500 MG CAPS, Take 500 mg by mouth, Disp: , Rfl:      metoprolol tartrate (LOPRESSOR) 50 MG tablet, Take 75 mg by mouth, Disp: , Rfl:      Omega-3 Fatty Acids (FISH OIL) 500 MG CAPS, , Disp: , Rfl:      omeprazole (PRILOSEC) 20 MG DR capsule, Take 20 mg by mouth, Disp: , Rfl:      oxybutynin (DITROPAN) 5 MG tablet, Take 2.5 mg by mouth, Disp: , Rfl:      oxyCODONE (ROXICODONE) 5 MG tablet, TAKE ONE TABLET BY MOUTH EVERY 6 HOURS FOR PAIN, Disp: , Rfl:      polyvinyl alcohol-povidone PF (REFRESH) 1.4-0.6 % ophthalmic solution, Apply 1 drop to eye, Disp: , Rfl:      tamsulosin (FLOMAX) 0.4 MG capsule, TAKE ONE CAPSULE BY MOUTH EVERY DAY FOR BLADDER EMPTYING, Disp: , Rfl:      zinc  gluconate 50 MG tablet, Take 50 mg by mouth, Disp: , Rfl:     Allergies   Allergen Reactions     Diltiazem Rash     Other reaction(s): Blistering rash  Skin peeling  Other reaction(s): Blistering rash  Skin peeling       Eliquis [Apixaban]      Nose bleeds and rash     Doxycycline Unknown and Rash     Rapid heart beat ????  Rapid heart beat ????       Tetracycline Rash     Allergies   Allergen Reactions     Diltiazem Rash     Other reaction(s): Blistering rash  Skin peeling  Other reaction(s): Blistering rash  Skin peeling       Eliquis [Apixaban]      Nose bleeds and rash     Doxycycline Unknown and Rash     Rapid heart beat ????  Rapid heart beat ????       Tetracycline Rash     On the phone call, the patient reports no neurologic deficit    I had reviewed the MRI from 7/15/2021    AP: 80 M with stage IV neuroendocrine lung CA with three brain metastasis.I had reviewed this case with Dr. Miner. Given the age of the patient and the location/size of the lesions, I think that the patient would most benefit from SRS, rather than surgical resection. Rationale, risks, and benefits of the procedure was reviewed with the patient. Informed consent obtained. I will move forward to coordinate with Dr. Miner's team (radiaiton oncology) to schedule the SRS.

## 2021-09-03 NOTE — NURSING NOTE
Patient verified meds and allergies are correct via patients echeck in.    Reji Villa, Virtual Facilitator

## 2021-09-09 LAB — COVID-19 VIRUS BY PCR (EXTERNAL RESULT): NEGATIVE

## 2021-09-13 ENCOUNTER — HOSPITAL ENCOUNTER (OUTPATIENT)
Dept: MRI IMAGING | Facility: CLINIC | Age: 81
End: 2021-09-13
Attending: RADIOLOGY
Payer: COMMERCIAL

## 2021-09-13 ENCOUNTER — OFFICE VISIT (OUTPATIENT)
Dept: RADIATION ONCOLOGY | Facility: CLINIC | Age: 81
End: 2021-09-13
Attending: RADIOLOGY
Payer: COMMERCIAL

## 2021-09-13 DIAGNOSIS — C79.31 METASTASIS TO BRAIN (H): Primary | ICD-10-CM

## 2021-09-13 DIAGNOSIS — C79.31 BRAIN METASTASIS: Primary | ICD-10-CM

## 2021-09-13 DIAGNOSIS — C79.31 METASTASIS TO BRAIN (H): ICD-10-CM

## 2021-09-13 PROCEDURE — 77295 3-D RADIOTHERAPY PLAN: CPT | Mod: 26 | Performed by: RADIOLOGY

## 2021-09-13 PROCEDURE — 77300 RADIATION THERAPY DOSE PLAN: CPT | Mod: 26 | Performed by: RADIOLOGY

## 2021-09-13 PROCEDURE — 255N000002 HC RX 255 OP 636: Performed by: RADIOLOGY

## 2021-09-13 PROCEDURE — 77373 STRTCTC BDY RAD THER TX DLVR: CPT | Performed by: RADIOLOGY

## 2021-09-13 PROCEDURE — 77290 THER RAD SIMULAJ FIELD CPLX: CPT | Performed by: RADIOLOGY

## 2021-09-13 PROCEDURE — 70552 MRI BRAIN STEM W/DYE: CPT | Mod: 26 | Performed by: STUDENT IN AN ORGANIZED HEALTH CARE EDUCATION/TRAINING PROGRAM

## 2021-09-13 PROCEDURE — 77290 THER RAD SIMULAJ FIELD CPLX: CPT | Mod: 26 | Performed by: RADIOLOGY

## 2021-09-13 PROCEDURE — 77470 SPECIAL RADIATION TREATMENT: CPT | Mod: XU | Performed by: RADIOLOGY

## 2021-09-13 PROCEDURE — 77295 3-D RADIOTHERAPY PLAN: CPT | Performed by: RADIOLOGY

## 2021-09-13 PROCEDURE — 77334 RADIATION TREATMENT AID(S): CPT | Mod: 26 | Performed by: RADIOLOGY

## 2021-09-13 PROCEDURE — 77370 RADIATION PHYSICS CONSULT: CPT | Performed by: RADIOLOGY

## 2021-09-13 PROCEDURE — 77300 RADIATION THERAPY DOSE PLAN: CPT | Performed by: RADIOLOGY

## 2021-09-13 PROCEDURE — 70552 MRI BRAIN STEM W/DYE: CPT

## 2021-09-13 PROCEDURE — A9585 GADOBUTROL INJECTION: HCPCS | Performed by: RADIOLOGY

## 2021-09-13 PROCEDURE — 77334 RADIATION TREATMENT AID(S): CPT | Performed by: RADIOLOGY

## 2021-09-13 RX ORDER — CELECOXIB 100 MG/1
100 CAPSULE ORAL 2 TIMES DAILY PRN
COMMUNITY

## 2021-09-13 RX ORDER — FINASTERIDE 5 MG/1
5 TABLET, FILM COATED ORAL DAILY
COMMUNITY

## 2021-09-13 RX ORDER — LOPERAMIDE HCL 2 MG
2 CAPSULE ORAL
Status: ON HOLD | COMMUNITY
End: 2023-01-01

## 2021-09-13 RX ORDER — BENZONATATE 100 MG/1
100 CAPSULE ORAL 3 TIMES DAILY PRN
Status: ON HOLD | COMMUNITY
End: 2023-01-01

## 2021-09-13 RX ORDER — METHOCARBAMOL 750 MG/1
750 TABLET, FILM COATED ORAL 3 TIMES DAILY PRN
COMMUNITY

## 2021-09-13 RX ORDER — ASCORBIC ACID 500 MG
500 TABLET ORAL DAILY
COMMUNITY

## 2021-09-13 RX ORDER — PROCHLORPERAZINE MALEATE 10 MG
10 TABLET ORAL EVERY 6 HOURS PRN
COMMUNITY

## 2021-09-13 RX ORDER — PREGABALIN 150 MG/1
150 CAPSULE ORAL DAILY
COMMUNITY

## 2021-09-13 RX ORDER — ACETAMINOPHEN 500 MG
1000 TABLET ORAL EVERY 6 HOURS PRN
COMMUNITY

## 2021-09-13 RX ORDER — CHOLECALCIFEROL (VITAMIN D3) 50 MCG
1 TABLET ORAL DAILY
COMMUNITY

## 2021-09-13 RX ORDER — GADOBUTROL 604.72 MG/ML
10 INJECTION INTRAVENOUS ONCE
Status: COMPLETED | OUTPATIENT
Start: 2021-09-13 | End: 2021-09-13

## 2021-09-13 RX ORDER — AMIODARONE HYDROCHLORIDE 200 MG/1
200 TABLET ORAL DAILY
COMMUNITY

## 2021-09-13 RX ORDER — CODEINE PHOSPHATE AND GUAIFENESIN 10; 100 MG/5ML; MG/5ML
SOLUTION ORAL EVERY 4 HOURS PRN
Status: ON HOLD | COMMUNITY
End: 2023-01-01

## 2021-09-13 RX ORDER — CARBOXYMETHYLCELLULOSE SODIUM 5 MG/ML
1 SOLUTION/ DROPS OPHTHALMIC 4 TIMES DAILY PRN
COMMUNITY

## 2021-09-13 RX ADMIN — GADOBUTROL 10 ML: 604.72 INJECTION INTRAVENOUS at 07:37

## 2021-09-13 NOTE — LETTER
Date:October 8, 2021      Patient was self referred, no letter generated. Do not send.        [facility] Health Information

## 2021-09-13 NOTE — PROGRESS NOTES
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   pineal 500 cGy [place] 60 21 0    left occipital 2,000 cGy [place] 60 21 0    left basal ganglia 2,000 cGy [place] 60 21 0             DESCRIPTION OF PROCEDURE:    On 2021 the patient was brought to the [facility] Gamma Knife IconTM suite at [facility] and treated with fractionated stereotactic radiotherapy.     The [facility] Gamma Knife IconTM Plan software was used to create a highly conformal dose distribution using the number and size collimators detailed above.     TREATMENT:    The patient was brought to the Gamma Knife suite. A timeout was performed to confirm the correct patient and correct procedure.    Patient was identified by 2 methods.  Site was verified.    The mask was placed and a cone beam CT was done and fused to the previously approved plan.        The physicist and I evaluated the fusion and confirmed the target coverage after auto-registration.      The treatment was delivered using the [facility] Gamma Knife IconTM without complication.      The mask was removed and the patient was discharged home in stable condition.    The patient tolerated the treatment well and had no complications.    Approved by:  Netta Miner MD

## 2021-09-13 NOTE — LETTER
Date:October 8, 2021      Patient was self referred, no letter generated. Do not send.        [facility] Health Information

## 2021-09-13 NOTE — LETTER
2021         RE: Mulugeta Castillo  [address] [address]        Dear Colleague,    Thank you for referring your patient, Mulugeta Castillo, to the [facility] RADIATION ONCOLOGY GAMMA KNIFE. Please see a copy of my visit note below.      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   pineal 500 cGy [place] 60 21 0    left occipital 2,000 cGy [place] 60 21 0    left basal ganglia 2,000 cGy [place] 60 21 0             DESCRIPTION OF PROCEDURE:    On 2021 the patient was brought to the [facility] Gamma Knife IconTM suite at [facility] and treated with fractionated stereotactic radiotherapy.     The [facility] Gamma Knife IconTM Plan software was used to create a highly conformal dose distribution using the number and size collimators detailed above.     TREATMENT:    The patient was brought to the Gamma Knife suite. A timeout was performed to confirm the correct patient and correct procedure.    Patient was identified by 2 methods.  Site was verified.    The mask was placed and a cone beam CT was done and fused to the previously approved plan.        The physicist and I evaluated the fusion and confirmed the target coverage after auto-registration.      The treatment was delivered using the [place] Gamma Knife IconTM without complication.      The mask was removed and the patient was discharged home in stable condition.    The patient tolerated the treatment well and had no complications.    Approved by:  Netta Miner MD        Again, thank you for allowing me to  participate in the care of your patient.        Sincerely,        Netta Miner MD

## 2021-09-13 NOTE — LETTER
2021         RE: Mulugeta Castillo  [address] [facility] [address]        Dear Colleague,    Thank you for referring your patient, Mulugeta Castillo, to the [facility] RADIATION ONCOLOGY GAMMA KNIFE. Please see a copy of my visit note below.    Gamma Knife Operative Note    MRN: [id number]  Name: Mulugeta Castillo  : 1940    Date of procedure: 2021    Surgeon:  Pankaj Martinez MD PhD    Pre-operative Diagnosis:   Multiple brain metastases  Post-operative Diagnosis:   Same    Procedure: Gamma Knife Radiosurgery    Indication: This is a  80 y.o. 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 After case review in the brain tumor conference, the joint recommendation was to treat the patient with radiosurgery. Standard measurements were obtained for coordinate calculation to facilitate SRS delivery.    On the day of the procedure, informed consent was obtained. The previous MRI was reviewed. The treatment is planned on the Gamma plan system based on the MRI taken on the day of the procedure.  Immobilization was achieved through face mask. Treatment parameters were verified by myself, the treating radiation oncologist, and the physicist.     The lesion in the midbrain (1 cm in maximal diameter) was treated with 25 Gy delivered in five fractions. The remaining two lesions were sub-centimeter in maximal diameter and were treated with 20 Gy delivered in a single fraction. The dose was delivered in a highly conformal fashion. The treatment was performed at the [facility] gamma knife center.     I was present and performed the key portions of this procedure.    Pankaj Martinez MD PhD        Again, thank you for allowing me to participate in the care of your patient.        Sincerely,        Pankaj Martinez MD

## 2021-09-13 NOTE — PROGRESS NOTES
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   pineal 500 cGy [place] 60 21 0    left occipital 2,000 cGy [place] 60 21 0    left basal ganglia 2,000 cGy [place] 60 21 0             DESCRIPTION OF PROCEDURE:    On 2021 the patient was brought to the [facility] Gamma Knife IconTM suite at [facility] and treated with fractionated stereotactic radiotherapy.     The [facility] Gamma Knife IconTM Plan software was used to create a highly conformal dose distribution using the number and size collimators detailed above.     TREATMENT:    The patient was brought to the Gamma Knife suite. A timeout was performed to confirm the correct patient and correct procedure.    Patient was identified by 2 methods.  Site was verified.    The mask was placed and a cone beam CT was done and fused to the previously approved plan.        The physicist and I evaluated the fusion and confirmed the target coverage after auto-registration.      The treatment was delivered using the [place] Gamma Knife IconTM without complication.      The mask was removed and the patient was discharged home in stable condition.    The patient tolerated the treatment well and had no complications.    Approved by:  Netta Miner MD

## 2021-09-13 NOTE — LETTER
2021         RE: Mulugeta Castillo  [address] [address]        Dear Colleague,    Thank you for referring your patient, Mulugeta Castillo, to the [facility] RADIATION ONCOLOGY GAMMA KNIFE. Please see a copy of my visit note below.      Name: Mulugeta Castillo  : 1940 Medical Record #: [id number]  Diagnosis: C79.31 Secondary malignant neoplasm of brain  Date of Treatment: 2021  Referring Physicians: Netta Miner, Tumor Registry    GAMMA KNIFE DAILY TREATMENT PROCEDURE NOTE     Treatment Summary:  Radiation Oncology - Course: 1 Protocol:    Treatment Site Current Dose Modality From To Elapsed Days Fx.   pineal 500 cGy [place] 60 21 0    left occipital 2,000 cGy [place] 60 21 0    left basal ganglia 2,000 cGy [place] 60 21 0             DESCRIPTION OF PROCEDURE:    On 2021 the patient was brought to the [facility] Gamma Knife IconTM suite at [facility] and treated with fractionated stereotactic radiotherapy.     The [facility] Gamma Knife IconTM Plan software was used to create a highly conformal dose distribution using the number and size collimators detailed above.     TREATMENT:    The patient was brought to the Gamma Knife suite. A timeout was performed to confirm the correct patient and correct procedure.    Patient was identified by 2 methods.  Site was verified.    The mask was placed and a cone beam CT was done and fused to the previously approved plan.        The physicist and I evaluated the fusion and confirmed the target coverage after auto-registration.      The treatment was delivered using the [place] Gamma Knife IconTM without complication.      The mask was removed and the patient was discharged home in stable condition.    The patient tolerated the treatment well and had no complications.    Approved by:  Netta Miner MD        Again, thank you for allowing me to  participate in the care of your patient.        Sincerely,        Netta Miner MD

## 2021-09-15 ENCOUNTER — OFFICE VISIT (OUTPATIENT)
Dept: RADIATION ONCOLOGY | Facility: CLINIC | Age: 81
End: 2021-09-15
Attending: RADIOLOGY
Payer: COMMERCIAL

## 2021-09-15 DIAGNOSIS — C79.31 METASTASIS TO BRAIN (H): Primary | ICD-10-CM

## 2021-09-15 PROCEDURE — 77373 STRTCTC BDY RAD THER TX DLVR: CPT | Performed by: RADIOLOGY

## 2021-09-15 NOTE — LETTER
9/15/2021         RE: Mulugeta Castillo  [address] [facility] [address]        Dear Colleague,    Thank you for referring your patient, Mulugeta Castillo, to the [facility] RADIATION ONCOLOGY GAMMA KNIFE. Please see a copy of my visit note below.      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  pineal 1,000 cGy [place] 60 09/13/21 09/15/21 2 2/  left occipital 2,000 cGy [place] 60 21 0   left basal ganglia 2,000 cGy [place] 60 21 0           DESCRIPTION OF PROCEDURE:    On 9/15/2021, Mr. Castillo was brought to the [facility] Gamma Knife IconTM suite at [facility] and treated with fractionated stereotactic radiotherapy.     The [facility] Gamma Knife IconTM Plan software was used to create a highly conformal dose distribution using the number and size collimators detailed above.     TREATMENT:    Mr. Castillo was brought to the Gamma Knife suite. A timeout was performed to confirm the correct patient and correct procedure.    Patient was identified by 2 methods.  Site was verified.    The mask was placed and a cone beam CT was done and fused to the previously approved plan.        The physicist and I evaluated the fusion and confirmed the target coverage after auto-registration.      The treatment was delivered using the [facility] Gamma Knife IconTM without complication.     The mask was removed and the patient was discharged home in stable condition.    The patient tolerated the treatment well and had no complications.    Approved by:  Gisselle Gallo MD      Again, thank you for allowing me to participate in the  care of your patient.        Sincerely,        Gisselle Gallo MD

## 2021-09-15 NOTE — PROGRESS NOTES
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  pineal 1,000 cGy [place] 60 09/13/21 09/15/21 2 2/5  left occipital 2,000 cGy [place] 60 21 0   left basal ganglia 2,000 cGy [place] 60 21 0           DESCRIPTION OF PROCEDURE:    On 9/15/2021, Mr. Castillo was brought to the [facility] Gamma Knife IconTM suite at [facility] and treated with fractionated stereotactic radiotherapy.     The [facility] Gamma Knife IconTM Plan software was used to create a highly conformal dose distribution using the number and size collimators detailed above.     TREATMENT:    Mr. Castillo was brought to the Gamma Knife suite. A timeout was performed to confirm the correct patient and correct procedure.    Patient was identified by 2 methods.  Site was verified.    The mask was placed and a cone beam CT was done and fused to the previously approved plan.        The physicist and I evaluated the fusion and confirmed the target coverage after auto-registration.      The treatment was delivered using the [facility] Gamma Knife IconTM without complication.     The mask was removed and the patient was discharged home in stable condition.    The patient tolerated the treatment well and had no complications.    Approved by:  Gisselle Gallo MD

## 2021-09-17 ENCOUNTER — OFFICE VISIT (OUTPATIENT)
Dept: RADIATION ONCOLOGY | Facility: CLINIC | Age: 81
End: 2021-09-17
Attending: RADIOLOGY
Payer: COMMERCIAL

## 2021-09-17 DIAGNOSIS — C79.31 SECONDARY MALIGNANT NEOPLASM OF BRAIN (H): Primary | ICD-10-CM

## 2021-09-17 PROCEDURE — 77373 STRTCTC BDY RAD THER TX DLVR: CPT | Performed by: RADIOLOGY

## 2021-09-17 NOTE — PROGRESS NOTES
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  pineal 1,500 cGy [place] 60 21 4 3/5  left occipital 2,000 cGy [place] 60 21 0   left basal ganglia 2,000 cGy [place] 60 21 0           DESCRIPTION OF PROCEDURE:    On 2021, Mr. Castillo was brought to the [facility] Gamma Knife IconTM suite at [facility] and treated with fractionated stereotactic radiotherapy.     The [facility] Gamma Knife IconTM Plan software was used to create a highly conformal dose distribution using the number and size collimators detailed above.     TREATMENT:    Mr. Castillo was brought to the Gamma Knife suite. A timeout was performed to confirm the correct patient and correct procedure.    Patient was identified by 2 methods.  Site was verified.    The mask was placed and a cone beam CT was done and fused to the previously approved plan.        The physicist and I evaluated the fusion and confirmed the target coverage after auto-registration.      The treatment was delivered using the [facility] Gamma Knife IconTM without complication.     The mask was removed and the patient was discharged home in stable condition.    The patient tolerated the treatment well and had no complications.    Approved by:  Tian Randall MD

## 2021-09-20 ENCOUNTER — OFFICE VISIT (OUTPATIENT)
Dept: RADIATION ONCOLOGY | Facility: CLINIC | Age: 81
End: 2021-09-20
Attending: RADIOLOGY
Payer: COMMERCIAL

## 2021-09-20 DIAGNOSIS — C79.31 METASTASIS TO BRAIN (H): Primary | ICD-10-CM

## 2021-09-20 PROCEDURE — 77373 STRTCTC BDY RAD THER TX DLVR: CPT | Performed by: RADIOLOGY

## 2021-09-20 NOTE — LETTER
2021         RE: Mulugeta Castillo  [address] [facility] [address]        Dear Colleague,    Thank you for referring your patient, Mulugeta Castillo, to the [facility] RADIATION ONCOLOGY GAMMA KNIFE. Please see a copy of my visit note below.      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  pineal 2,000 cGy [place] 60 21 7 4/5  left occipital 2,000 cGy [place] 60 21 0   left basal ganglia 2,000 cGy [place] 60 21 0           DESCRIPTION OF PROCEDURE:    On 2021, Mr. Castillo was brought to the [facility] Gamma Knife IconTM suite at [facility] and treated with fractionated stereotactic radiotherapy.     The [facility] Gamma Knife IconTM Plan software was used to create a highly conformal dose distribution using the number and size collimators detailed above.     TREATMENT:    Mr. Castillo was brought to the Gamma Knife suite. A timeout was performed to confirm the correct patient and correct procedure.    Patient was identified by 2 methods.  Site was verified.    The mask was placed and a cone beam CT was done and fused to the previously approved plan.        The physicist and I evaluated the fusion and confirmed the target coverage after auto-registration.      The treatment was delivered using the [facility] Gamma Knife IconTM without complication.     The mask was removed and the patient was discharged home in stable condition.    The patient tolerated the treatment well and had no complications.    Approved by:  Derick Fernandes MD      Again, thank you for allowing me to participate in the care  of your patient.        Sincerely,        Derick Fernandes MD

## 2021-09-20 NOTE — PROGRESS NOTES
Gamma Knife Operative Note    MRN: [id number]  Name: Mulugeta Castillo  : 1940    Date of procedure: 2021    Surgeon:  Pankaj Martinez MD PhD    Pre-operative Diagnosis:   Multiple brain metastases  Post-operative Diagnosis:   Same    Procedure: Gamma Knife Radiosurgery    Indication: This is a  80 y.o. 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 After case review in the brain tumor conference, the joint recommendation was to treat the patient with radiosurgery. Standard measurements were obtained for coordinate calculation to facilitate SRS delivery.    On the day of the procedure, informed consent was obtained. The previous MRI was reviewed. The treatment is planned on the Gamma plan system based on the MRI taken on the day of the procedure.  Immobilization was achieved through face mask. Treatment parameters were verified by myself, the treating radiation oncologist, and the physicist.     The lesion in the midbrain (1 cm in maximal diameter) was treated with 25 Gy delivered in five fractions. The remaining two lesions were sub-centimeter in maximal diameter and were treated with 20 Gy delivered in a single fraction. The dose was delivered in a highly conformal fashion. The treatment was performed at the [facility] gamma knife center.     I was present and performed the key portions of this procedure.    Pankaj Martinez MD PhD

## 2021-09-20 NOTE — PROGRESS NOTES
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  pineal 2,000 cGy [place] 60 21 7 4/5  left occipital 2,000 cGy [place] 60 21 0   left basal ganglia 2,000 cGy [place] 60 21 0           DESCRIPTION OF PROCEDURE:    On 2021, Mr. Castillo was brought to the [facility] Gamma Knife IconTM suite at [facility] and treated with fractionated stereotactic radiotherapy.     The [facility] Gamma Knife IconTM Plan software was used to create a highly conformal dose distribution using the number and size collimators detailed above.     TREATMENT:    Mr. Castillo was brought to the Gamma Knife suite. A timeout was performed to confirm the correct patient and correct procedure.    Patient was identified by 2 methods.  Site was verified.    The mask was placed and a cone beam CT was done and fused to the previously approved plan.        The physicist and I evaluated the fusion and confirmed the target coverage after auto-registration.      The treatment was delivered using the [facility] Gamma Knife IconTM without complication.     The mask was removed and the patient was discharged home in stable condition.    The patient tolerated the treatment well and had no complications.    Approved by:  Derick Fernandes MD

## 2021-09-20 NOTE — LETTER
Date:October 8, 2021      Patient was self referred, no letter generated. Do not send.        [facility] Health Information

## 2021-09-21 ENCOUNTER — OFFICE VISIT (OUTPATIENT)
Dept: RADIATION ONCOLOGY | Facility: CLINIC | Age: 81
End: 2021-09-21
Attending: RADIOLOGY
Payer: COMMERCIAL

## 2021-09-21 VITALS
RESPIRATION RATE: 16 BRPM | HEART RATE: 78 BPM | OXYGEN SATURATION: 100 % | DIASTOLIC BLOOD PRESSURE: 66 MMHG | SYSTOLIC BLOOD PRESSURE: 114 MMHG

## 2021-09-21 DIAGNOSIS — C79.31 METASTASIS TO BRAIN (H): Primary | ICD-10-CM

## 2021-09-21 PROCEDURE — 77373 STRTCTC BDY RAD THER TX DLVR: CPT | Performed by: RADIOLOGY

## 2021-09-21 PROCEDURE — 77336 RADIATION PHYSICS CONSULT: CPT | Performed by: RADIOLOGY

## 2021-09-21 PROCEDURE — 77435 SBRT MANAGEMENT: CPT | Performed by: RADIOLOGY

## 2021-09-21 RX ORDER — MAGNESIUM OXIDE 420 MG/1
TABLET ORAL
Status: ON HOLD | COMMUNITY
End: 2023-01-01

## 2021-09-21 NOTE — LETTER
Date:October 13, 2021      Patient was self referred, no letter generated. Do not send.        [facility] Health Information

## 2021-09-21 NOTE — LETTER
2021         RE: Mulugeta Castillo  [address] [facility] [address]        Dear Colleague,    Thank you for referring your patient, Mulugeta Castillo, to the [facility] RADIATION ONCOLOGY GAMMA KNIFE. Please see a copy of my visit note below.      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  pineal 2,500 cGy [place] 60 21 7 5/5  left occipital 2,000 cGy [place] 60 21 0   left basal ganglia 2,000 cGy [place] 60 21 0           DESCRIPTION OF PROCEDURE:    On 2021, Mr. Castillo was brought to the [facility] Gamma Knife IconTM suite at [facility] and treated with fractionated stereotactic radiotherapy.     The [facility] Gamma Knife IconTM Plan software was used to create a highly conformal dose distribution using the number and size collimators detailed above.     TREATMENT:    Mr. Castillo was brought to the Gamma Knife suite. A timeout was performed to confirm the correct patient and correct procedure.    Patient was identified by 2 methods.  Site was verified.    The mask was placed and a cone beam CT was done and fused to the previously approved plan.        The physicist and I evaluated the fusion and confirmed the target coverage after auto-registration.      The treatment was delivered using the [facility] Gamma Knife IconTM without complication.     The mask was removed and the patient was discharged home in stable condition.    The patient tolerated the treatment well and had no complications.    Approved by:  VICKI Barajas MD      Again, thank you for allowing me to participate in the care  of your patient.        Sincerely,        Rony Barajas MD

## 2021-09-22 ENCOUNTER — TELEPHONE (OUTPATIENT)
Dept: RADIATION ONCOLOGY | Facility: CLINIC | Age: 81
End: 2021-09-22

## 2021-09-22 NOTE — PROGRESS NOTES
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  pineal 2,500 cGy [place] 60 21 7 5/5  left occipital 2,000 cGy [place] 60 21 0   left basal ganglia 2,000 cGy [place] 60 21 0           DESCRIPTION OF PROCEDURE:    On 2021, Mr. Castillo was brought to the [facility] Gamma Knife IconTM suite at [facility] and treated with fractionated stereotactic radiotherapy.     The [facility] Gamma Knife IconTM Plan software was used to create a highly conformal dose distribution using the number and size collimators detailed above.     TREATMENT:    Mr. Castillo was brought to the Gamma Knife suite. A timeout was performed to confirm the correct patient and correct procedure.    Patient was identified by 2 methods.  Site was verified.    The mask was placed and a cone beam CT was done and fused to the previously approved plan.        The physicist and I evaluated the fusion and confirmed the target coverage after auto-registration.      The treatment was delivered using the [facility] Gamma Knife IconTM without complication.     The mask was removed and the patient was discharged home in stable condition.    The patient tolerated the treatment well and had no complications.    Approved by:  VICKI Barajas MD

## 2021-09-22 NOTE — TELEPHONE ENCOUNTER
Called and spoke to patient post gamma knife. No issues rt gamma knife. Denies headache or any other issues. Has our number to call if any concerns.

## 2021-10-04 ENCOUNTER — ONCOLOGY VISIT (OUTPATIENT)
Dept: RADIATION ONCOLOGY | Facility: CLINIC | Age: 81
End: 2021-10-04

## 2021-10-05 NOTE — PROCEDURES
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     Treatment Summary:  Radiation Oncology - Course: 1 Protocol:   Treatment Site Current Dose Modality From To Elapsed Days Fx.  pineal  2,500 cGy [place] 60  2021   8  5  left occipital  2,000 cGy [place] 60  2021   1  left basal ganglia  2,000 cGy [place] 60  2021   1                Dose per Fraction:     either single fx for  of the lesions of 20 Gy . or fractionated 500 cGy x 5 fx  Total Dose:     20 Gy or  25 Gy           COMMENTS:                         Tolerated well     ED visits/hospitalizations:0     Missed treatments:0     Acute Toxicity Profile by CTC v5.0:none     PAIN MANAGEMENT:      na                        FOLLOW UP PLAN:      Follow up MRI of brain and to see me in 3mo                      Staff Physician: Netta Miner M.D.  Physicist: , PhD                                        Radiation Oncology:  [facility] [address] [place] [address]

## 2022-01-10 ENCOUNTER — OFFICE VISIT (OUTPATIENT)
Dept: RADIATION ONCOLOGY | Facility: CLINIC | Age: 82
End: 2022-01-10
Attending: RADIOLOGY
Payer: COMMERCIAL

## 2022-01-10 ENCOUNTER — HOSPITAL ENCOUNTER (OUTPATIENT)
Dept: MRI IMAGING | Facility: CLINIC | Age: 82
Discharge: HOME | End: 2022-01-10
Attending: RADIOLOGY | Admitting: RADIOLOGY
Payer: COMMERCIAL

## 2022-01-10 VITALS
RESPIRATION RATE: 16 BRPM | BODY MASS INDEX: 29.19 KG/M2 | HEART RATE: 56 BPM | DIASTOLIC BLOOD PRESSURE: 40 MMHG | OXYGEN SATURATION: 95 % | WEIGHT: 209.3 LBS | SYSTOLIC BLOOD PRESSURE: 127 MMHG

## 2022-01-10 DIAGNOSIS — C79.31 METASTASIS TO BRAIN (H): Primary | ICD-10-CM

## 2022-01-10 DIAGNOSIS — C79.31 METASTASIS TO BRAIN (H): ICD-10-CM

## 2022-01-10 PROCEDURE — A9585 GADOBUTROL INJECTION: HCPCS | Performed by: RADIOLOGY

## 2022-01-10 PROCEDURE — 70553 MRI BRAIN STEM W/O & W/DYE: CPT

## 2022-01-10 PROCEDURE — 255N000002 HC RX 255 OP 636: Performed by: RADIOLOGY

## 2022-01-10 PROCEDURE — 70553 MRI BRAIN STEM W/O & W/DYE: CPT | Mod: 26 | Performed by: RADIOLOGY

## 2022-01-10 RX ORDER — OXYCODONE HYDROCHLORIDE 10 MG/1
TABLET ORAL
Status: ON HOLD | COMMUNITY
Start: 2021-07-09 | End: 2023-01-01

## 2022-01-10 RX ORDER — SENNOSIDES A AND B 8.6 MG/1
17.2 TABLET, FILM COATED ORAL
Status: ON HOLD | COMMUNITY
End: 2023-01-01

## 2022-01-10 RX ORDER — TRAZODONE HYDROCHLORIDE 50 MG/1
1 TABLET, FILM COATED ORAL
Status: ON HOLD | COMMUNITY
Start: 2021-10-20 | End: 2023-01-01

## 2022-01-10 RX ORDER — GADOBUTROL 604.72 MG/ML
10 INJECTION INTRAVENOUS ONCE
Status: COMPLETED | OUTPATIENT
Start: 2022-01-10 | End: 2022-01-10

## 2022-01-10 RX ORDER — METOPROLOL TARTRATE 50 MG
TABLET ORAL
Status: ON HOLD | COMMUNITY
Start: 2021-12-07 | End: 2023-01-01

## 2022-01-10 RX ORDER — MORPHINE SULFATE 15 MG/1
TABLET, FILM COATED, EXTENDED RELEASE ORAL
Status: ON HOLD | COMMUNITY
Start: 2021-11-05 | End: 2023-01-01

## 2022-01-10 RX ADMIN — GADOBUTROL 10 ML: 604.72 INJECTION INTRAVENOUS at 14:31

## 2022-01-10 ASSESSMENT — PAIN SCALES - GENERAL: PAINLEVEL: NO PAIN (0)

## 2022-01-10 NOTE — PROGRESS NOTES
FOLLOW-UP VISIT    Patient Name: Mulugeta Castillo      : 1940     Age: 81 year old        ______________________________________________________________________________     Chief Complaint   Patient presents with     Cancer     follow up to gamma knife treatment      /40 (BP Location: Right arm, Patient Position: Sitting, Cuff Size: Adult Regular)   Pulse 56   Resp 16   Wt 94.9 kg (209 lb 4.8 oz)   SpO2 95%   BMI 29.19 kg/m       Date Radiation Completed: 2021 gamma knife     Pain  Denies    Labs  Other Labs: No    Imaging  MRI: brain      Other Appointments:     MD Name: chemo Appointment Date: 2022   MD Name:Elder  Appointment Date:2022   MD Name: Appointment Date:   Other Appointment Notes:     Residual Radiation side effect: denies     Additional Instructions:     Nurse face-to-face time: Level 2:  5 min face to face time    Review Of Systems  Skin: negative  Eyes: cataracts  Ears/Nose/Throat: hearing loss  Respiratory: Shortness of breath- with exersion  Cardiovascular: lower extremity edema  Gastrointestinal: constipation and diarrhea  Genitourinary: negative  Musculoskeletal: negative  Neurologic: negative and lightheadedness resolved after pregablin reduced by pain team  Psychiatric: negative  Hematologic/Lymphatic/Immunologic: negative  Endocrine: negative

## 2022-01-10 NOTE — PROGRESS NOTES
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  AREAS TREATED:   3 areas        TYPES OF RADIATION GIVEN: GK    INTERVAL SINCE COMPLETION OF RADIATION THERAPY: 3 mo      SUBJECTIVE: He is feeling well.  He has no headaches or other complaints.   OBJECTIVE:     /40 (BP Location: Right arm, Patient Position: Sitting, Cuff Size: Adult Regular)   Pulse 56   Resp 16   Wt 94.9 kg (209 lb 4.8 oz)   SpO2 95%   BMI 29.19 kg/m     GENERAL: Healthy, alert and no distress  EYES: Eyes grossly normal to inspection.  No discharge or erythema, or obvious scleral/conjunctival abnormalities.  RESP: No audible wheeze, cough, or visible cyanosis.  No visible retractions or increased work of breathing.    SKIN: Visible skin clear. No significant rash, abnormal pigmentation or lesions.  NEURO: Cranial nerves grossly intact.  Mentation and speech appropriate for age.  PSYCH: Mentation appears normal, affect normal/bright, judgement and insight intact, normal speech and appearance well-groomed.    MRI today :   Impression:  1. Decreased size of the left basal ganglia lesion which is only faintly visualized on today's study.  2. 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  3. Left occipital lesion is not visualized on today's study      ASSESSMENT : No evidence of brain metastasis    RECOMMENDATION :  Follow up brain MRI every 3 months.  [facility]  approx every 3 months.   He is seeing Dr Ford later this week for chemotherapy.    He can follow up with us prn     Thank you for allowing me to participate in this patients  care.   Please do not heistate to call if you have questions   Netta Finch   Department of Therapeutic Radiology -Radiation Oncology  [facility]    Phone:[facility] [phone]/ JUDY DESAI [phone]/[facility]  [phone]  PAGER   [phone]         CC  Patient Care Team:  Roopa Hong PTA as PCP - General (Physical Therapy Asst)  Pankaj Martinez MD as Assigned Neuroscience Provider  Netta Miner MD as Assigned Cancer Care Provider   [facility]   Medical Oncology.  DR Monica Ford

## 2022-01-10 NOTE — LETTER
1/10/2022         RE: Mulugeta Castillo  [address]  [address]  [facility] [address]        Dear Colleague,    Thank you for referring your patient, Mulugeta Castillo, to the [facility] RADIATION ONCOLOGY. Please see a copy of my visit note below.    FOLLOW-UP VISIT    Patient Name: Mulugeta Castillo      : 1940     Age: 81 year old        ______________________________________________________________________________     Chief Complaint   Patient presents with     Cancer     follow up to gamma knife treatment      /40 (BP Location: Right arm, Patient Position: Sitting, Cuff Size: Adult Regular)   Pulse 56   Resp 16   Wt 94.9 kg (209 lb 4.8 oz)   SpO2 95%   BMI 29.19 kg/m       Date Radiation Completed: 2021 gamma knife     Pain  Denies    Labs  Other Labs: No    Imaging  MRI: brain      Other Appointments:     MD Name: chemo Appointment Date: 2022   MD Name:Elder  Appointment Date:2022   MD Name: Appointment Date:   Other Appointment Notes:     Residual Radiation side effect: denies     Additional Instructions:     Nurse face-to-face time: Level 2:  5 min face to face time    Review Of Systems  Skin: negative  Eyes: cataracts  Ears/Nose/Throat: hearing loss  Respiratory: Shortness of breath- with exersion  Cardiovascular: lower extremity edema  Gastrointestinal: constipation and diarrhea  Genitourinary: negative  Musculoskeletal: negative  Neurologic: negative and lightheadedness resolved after pregablin reduced by pain team  Psychiatric: negative  Hematologic/Lymphatic/Immunologic: negative  Endocrine: negative        Department of Therapeutic Radiology--Radiation Oncology                   [place] [address] [place]  [address]  Office:  [phone]  Fax:  [phone]   Radiation Oncology Clinic  [address] [place] [address]  Phone:  [phone]  Fax:  [phone]     RE: Mulugeta Castillo : 1940   MRN: [id number] IRA: 1/10/2022      OUTPATIENT VISIT NOTE       DIAGNOSIS: of 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  AREAS TREATED:   3 areas        TYPES OF RADIATION GIVEN: GK    INTERVAL SINCE COMPLETION OF RADIATION THERAPY: 3 mo      SUBJECTIVE: He is feeling well.  He has no headaches or other complaints.   OBJECTIVE:     /40 (BP Location: Right arm, Patient Position: Sitting, Cuff Size: Adult Regular)   Pulse 56   Resp 16   Wt 94.9 kg (209 lb 4.8 oz)   SpO2 95%   BMI 29.19 kg/m     GENERAL: Healthy, alert and no distress  EYES: Eyes grossly normal to inspection.  No discharge or erythema, or obvious scleral/conjunctival abnormalities.  RESP: No audible wheeze, cough, or visible cyanosis.  No visible retractions or increased work of breathing.    SKIN: Visible skin clear. No significant rash, abnormal pigmentation or lesions.  NEURO: Cranial nerves grossly intact.  Mentation and speech appropriate for age.  PSYCH: Mentation appears normal, affect normal/bright, judgement and insight intact, normal speech and appearance well-groomed.    MRI today :   Impression:  1. Decreased size of the left basal ganglia lesion which is only faintly visualized on today's study.  2. 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  3. Left occipital lesion is not visualized on today's study      ASSESSMENT : No evidence of brain metastasis    RECOMMENDATION :  Follow up brain MRI every 3 months.  [facility] approx every 3 months.   He is seeing Dr Ford later this week for chemotherapy.    He can follow up with us prn     Thank you for allowing me to participate in this patients  care.   Please do not heistate to call if you have questions   Netta Finch   Department of Therapeutic Radiology -Radiation Oncology  [facility],  [place]    Phone:[facility] [phone]/ JUDY DESAI [phone]/[facility] [place]  [phone]  PAGER   [phone]         CC  Patient Care Team:  Roopa Hong PTA as PCP - General (Physical Therapy Asst)  Pankaj Martinez MD as Assigned Neuroscience Provider  [facility]   Medical Oncology.  DR Monica Ford

## 2023-01-01 ENCOUNTER — APPOINTMENT (OUTPATIENT)
Dept: PHYSICAL THERAPY | Facility: CLINIC | Age: 83
DRG: 193 | End: 2023-01-01
Attending: INTERNAL MEDICINE
Payer: COMMERCIAL

## 2023-01-01 ENCOUNTER — TELEPHONE (OUTPATIENT)
Dept: FAMILY MEDICINE | Facility: CLINIC | Age: 83
End: 2023-01-01
Payer: COMMERCIAL

## 2023-01-01 ENCOUNTER — APPOINTMENT (OUTPATIENT)
Dept: OCCUPATIONAL THERAPY | Facility: CLINIC | Age: 83
DRG: 193 | End: 2023-01-01
Attending: INTERNAL MEDICINE
Payer: COMMERCIAL

## 2023-01-01 ENCOUNTER — HOSPITAL ENCOUNTER (INPATIENT)
Facility: CLINIC | Age: 83
LOS: 3 days | Discharge: HOME | DRG: 193 | End: 2023-06-29
Attending: INTERNAL MEDICINE | Admitting: INTERNAL MEDICINE
Payer: COMMERCIAL

## 2023-01-01 VITALS
HEIGHT: 71 IN | HEART RATE: 101 BPM | OXYGEN SATURATION: 92 % | BODY MASS INDEX: 31.82 KG/M2 | TEMPERATURE: 97.7 F | RESPIRATION RATE: 16 BRPM | DIASTOLIC BLOOD PRESSURE: 68 MMHG | WEIGHT: 227.29 LBS | SYSTOLIC BLOOD PRESSURE: 159 MMHG

## 2023-01-01 DIAGNOSIS — R91.8 LUNG MASS: ICD-10-CM

## 2023-01-01 DIAGNOSIS — J18.9 PNEUMONIA DUE TO INFECTIOUS ORGANISM, UNSPECIFIED LATERALITY, UNSPECIFIED PART OF LUNG: Primary | ICD-10-CM

## 2023-01-01 DIAGNOSIS — I10 ESSENTIAL HYPERTENSION: ICD-10-CM

## 2023-01-01 LAB
ANION GAP SERPL CALCULATED.3IONS-SCNC: 12 MMOL/L (ref 7–15)
ANION GAP SERPL CALCULATED.3IONS-SCNC: 6 MMOL/L (ref 7–15)
ANION GAP SERPL CALCULATED.3IONS-SCNC: 7 MMOL/L (ref 7–15)
ANION GAP SERPL CALCULATED.3IONS-SCNC: 8 MMOL/L (ref 7–15)
ATRIAL RATE - MUSE: 68 BPM
BASE EXCESS BLDV CALC-SCNC: ABNORMAL MMOL/L
BASOPHILS # BLD MANUAL: 0 10E3/UL (ref 0–0.2)
BASOPHILS NFR BLD MANUAL: 0 %
BUN SERPL-MCNC: 19.2 MG/DL (ref 8–23)
BUN SERPL-MCNC: 23.3 MG/DL (ref 8–23)
BUN SERPL-MCNC: 27.1 MG/DL (ref 8–23)
BUN SERPL-MCNC: 32 MG/DL (ref 8–23)
CALCIUM SERPL-MCNC: 9.3 MG/DL (ref 8.8–10.2)
CALCIUM SERPL-MCNC: 9.4 MG/DL (ref 8.8–10.2)
CHLORIDE SERPL-SCNC: 101 MMOL/L (ref 98–107)
CHLORIDE SERPL-SCNC: 102 MMOL/L (ref 98–107)
CHLORIDE SERPL-SCNC: 103 MMOL/L (ref 98–107)
CHLORIDE SERPL-SCNC: 98 MMOL/L (ref 98–107)
CREAT SERPL-MCNC: 0.84 MG/DL (ref 0.67–1.17)
CREAT SERPL-MCNC: 0.95 MG/DL (ref 0.67–1.17)
CREAT SERPL-MCNC: 1.17 MG/DL (ref 0.67–1.17)
CREAT SERPL-MCNC: 1.29 MG/DL (ref 0.67–1.17)
CREAT SERPL-MCNC: 1.44 MG/DL (ref 0.67–1.17)
CREAT UR-MCNC: 58 MG/DL
DEPRECATED HCO3 PLAS-SCNC: 25 MMOL/L (ref 22–29)
DEPRECATED HCO3 PLAS-SCNC: 27 MMOL/L (ref 22–29)
DEPRECATED HCO3 PLAS-SCNC: 29 MMOL/L (ref 22–29)
DEPRECATED HCO3 PLAS-SCNC: 31 MMOL/L (ref 22–29)
DIASTOLIC BLOOD PRESSURE - MUSE: NORMAL MMHG
EOSINOPHIL # BLD MANUAL: 0 10E3/UL (ref 0–0.7)
EOSINOPHIL NFR BLD MANUAL: 0 %
ERYTHROCYTE [DISTWIDTH] IN BLOOD BY AUTOMATED COUNT: 16.6 % (ref 10–15)
ERYTHROCYTE [DISTWIDTH] IN BLOOD BY AUTOMATED COUNT: 16.8 % (ref 10–15)
ERYTHROCYTE [DISTWIDTH] IN BLOOD BY AUTOMATED COUNT: 17 % (ref 10–15)
FRACT EXCRET NA UR+SERPL-RTO: 1.3 %
GFR SERPL CREATININE-BSD FRML MDRD: 48 ML/MIN/1.73M2
GFR SERPL CREATININE-BSD FRML MDRD: 55 ML/MIN/1.73M2
GFR SERPL CREATININE-BSD FRML MDRD: 62 ML/MIN/1.73M2
GFR SERPL CREATININE-BSD FRML MDRD: 79 ML/MIN/1.73M2
GFR SERPL CREATININE-BSD FRML MDRD: 87 ML/MIN/1.73M2
GLUCOSE SERPL-MCNC: 113 MG/DL (ref 70–99)
GLUCOSE SERPL-MCNC: 144 MG/DL (ref 70–99)
GLUCOSE SERPL-MCNC: 145 MG/DL (ref 70–99)
GLUCOSE SERPL-MCNC: 95 MG/DL (ref 70–99)
HCO3 BLDV-SCNC: 33 MMOL/L (ref 21–28)
HCT VFR BLD AUTO: 25.5 % (ref 40–53)
HCT VFR BLD AUTO: 26.3 % (ref 40–53)
HCT VFR BLD AUTO: 27.6 % (ref 40–53)
HGB BLD-MCNC: 8.2 G/DL (ref 13.3–17.7)
HGB BLD-MCNC: 8.3 G/DL (ref 13.3–17.7)
HGB BLD-MCNC: 8.3 G/DL (ref 13.3–17.7)
HOLD SPECIMEN: NORMAL
HOLD SPECIMEN: NORMAL
INTERPRETATION ECG - MUSE: NORMAL
LYMPHOCYTES # BLD MANUAL: 3.4 10E3/UL (ref 0.8–5.3)
LYMPHOCYTES NFR BLD MANUAL: 4 %
MCH RBC QN AUTO: 36.9 PG (ref 26.5–33)
MCH RBC QN AUTO: 37.3 PG (ref 26.5–33)
MCH RBC QN AUTO: 38.1 PG (ref 26.5–33)
MCHC RBC AUTO-ENTMCNC: 30.1 G/DL (ref 31.5–36.5)
MCHC RBC AUTO-ENTMCNC: 31.2 G/DL (ref 31.5–36.5)
MCHC RBC AUTO-ENTMCNC: 32.5 G/DL (ref 31.5–36.5)
MCV RBC AUTO: 117 FL (ref 78–100)
MCV RBC AUTO: 120 FL (ref 78–100)
MCV RBC AUTO: 123 FL (ref 78–100)
MONOCYTES # BLD MANUAL: 0.8 10E3/UL (ref 0–1.3)
MONOCYTES NFR BLD MANUAL: 1 %
MRSA DNA SPEC QL NAA+PROBE: NEGATIVE
NEUTROPHILS # BLD MANUAL: 80.2 10E3/UL (ref 1.6–8.3)
NEUTROPHILS NFR BLD MANUAL: 95 %
O2/TOTAL GAS SETTING VFR VENT: 3 %
P AXIS - MUSE: 39 DEGREES
PATH REPORT.COMMENTS IMP SPEC: NORMAL
PATH REPORT.FINAL DX SPEC: NORMAL
PATH REPORT.MICROSCOPIC SPEC OTHER STN: NORMAL
PATH REPORT.MICROSCOPIC SPEC OTHER STN: NORMAL
PCO2 BLDV: 70 MM HG (ref 40–50)
PH BLDV: 7.29 [PH] (ref 7.32–7.43)
PLAT MORPH BLD: ABNORMAL
PLAT MORPH BLD: NORMAL
PLATELET # BLD AUTO: 150 10E3/UL (ref 150–450)
PLATELET # BLD AUTO: 159 10E3/UL (ref 150–450)
PLATELET # BLD AUTO: 163 10E3/UL (ref 150–450)
PO2 BLDV: 39 MM HG (ref 25–47)
POTASSIUM SERPL-SCNC: 4.9 MMOL/L (ref 3.4–5.3)
POTASSIUM SERPL-SCNC: 5 MMOL/L (ref 3.4–5.3)
POTASSIUM SERPL-SCNC: 5 MMOL/L (ref 3.4–5.3)
POTASSIUM SERPL-SCNC: 5.1 MMOL/L (ref 3.4–5.3)
POTASSIUM SERPL-SCNC: 5.4 MMOL/L (ref 3.4–5.3)
PR INTERVAL - MUSE: 180 MS
PROCALCITONIN SERPL IA-MCNC: 7.95 NG/ML
QRS DURATION - MUSE: 80 MS
QT - MUSE: 390 MS
QTC - MUSE: 414 MS
R AXIS - MUSE: -19 DEGREES
RBC # BLD AUTO: 2.18 10E6/UL (ref 4.4–5.9)
RBC # BLD AUTO: 2.2 10E6/UL (ref 4.4–5.9)
RBC # BLD AUTO: 2.25 10E6/UL (ref 4.4–5.9)
RBC MORPH BLD: ABNORMAL
RBC MORPH BLD: NORMAL
RETICS # AUTO: 0.09 10E6/UL (ref 0.03–0.1)
RETICS/RBC NFR AUTO: 4 % (ref 0.5–2)
SA TARGET DNA: NEGATIVE
SODIUM SERPL-SCNC: 135 MMOL/L (ref 136–145)
SODIUM SERPL-SCNC: 137 MMOL/L (ref 136–145)
SODIUM SERPL-SCNC: 137 MMOL/L (ref 136–145)
SODIUM SERPL-SCNC: 140 MMOL/L (ref 136–145)
SODIUM UR-SCNC: 69 MMOL/L
SYSTOLIC BLOOD PRESSURE - MUSE: NORMAL MMHG
T AXIS - MUSE: 48 DEGREES
VENTRICULAR RATE- MUSE: 68 BPM
WBC # BLD AUTO: 58.1 10E3/UL (ref 4–11)
WBC # BLD AUTO: 77.4 10E3/UL (ref 4–11)
WBC # BLD AUTO: 84.4 10E3/UL (ref 4–11)

## 2023-01-01 PROCEDURE — 250N000013 HC RX MED GY IP 250 OP 250 PS 637: Performed by: INTERNAL MEDICINE

## 2023-01-01 PROCEDURE — 250N000011 HC RX IP 250 OP 636: Performed by: INTERNAL MEDICINE

## 2023-01-01 PROCEDURE — 84132 ASSAY OF SERUM POTASSIUM: CPT | Performed by: INTERNAL MEDICINE

## 2023-01-01 PROCEDURE — 94640 AIRWAY INHALATION TREATMENT: CPT

## 2023-01-01 PROCEDURE — 258N000003 HC RX IP 258 OP 636: Performed by: INTERNAL MEDICINE

## 2023-01-01 PROCEDURE — 85027 COMPLETE CBC AUTOMATED: CPT | Performed by: INTERNAL MEDICINE

## 2023-01-01 PROCEDURE — 87641 MR-STAPH DNA AMP PROBE: CPT | Performed by: INTERNAL MEDICINE

## 2023-01-01 PROCEDURE — 99232 SBSQ HOSP IP/OBS MODERATE 35: CPT | Performed by: INTERNAL MEDICINE

## 2023-01-01 PROCEDURE — 36415 COLL VENOUS BLD VENIPUNCTURE: CPT | Performed by: INTERNAL MEDICINE

## 2023-01-01 PROCEDURE — 97165 OT EVAL LOW COMPLEX 30 MIN: CPT | Mod: GO | Performed by: OCCUPATIONAL THERAPIST

## 2023-01-01 PROCEDURE — 99232 SBSQ HOSP IP/OBS MODERATE 35: CPT | Performed by: NURSE PRACTITIONER

## 2023-01-01 PROCEDURE — 250N000009 HC RX 250: Performed by: INTERNAL MEDICINE

## 2023-01-01 PROCEDURE — 97161 PT EVAL LOW COMPLEX 20 MIN: CPT | Mod: GP | Performed by: PHYSICAL THERAPIST

## 2023-01-01 PROCEDURE — 99233 SBSQ HOSP IP/OBS HIGH 50: CPT | Performed by: INTERNAL MEDICINE

## 2023-01-01 PROCEDURE — 99207 PR NO BILLABLE SERVICE THIS VISIT: CPT | Performed by: INTERNAL MEDICINE

## 2023-01-01 PROCEDURE — 85060 BLOOD SMEAR INTERPRETATION: CPT | Performed by: PATHOLOGY

## 2023-01-01 PROCEDURE — 120N000001 HC R&B MED SURG/OB

## 2023-01-01 PROCEDURE — 82803 BLOOD GASES ANY COMBINATION: CPT | Performed by: INTERNAL MEDICINE

## 2023-01-01 PROCEDURE — 250N000013 HC RX MED GY IP 250 OP 250 PS 637: Performed by: NURSE PRACTITIONER

## 2023-01-01 PROCEDURE — 97535 SELF CARE MNGMENT TRAINING: CPT | Mod: GO | Performed by: OCCUPATIONAL THERAPIST

## 2023-01-01 PROCEDURE — 85045 AUTOMATED RETICULOCYTE COUNT: CPT | Performed by: INTERNAL MEDICINE

## 2023-01-01 PROCEDURE — 85007 BL SMEAR W/DIFF WBC COUNT: CPT | Performed by: INTERNAL MEDICINE

## 2023-01-01 PROCEDURE — 999N000157 HC STATISTIC RCP TIME EA 10 MIN

## 2023-01-01 PROCEDURE — 93010 ELECTROCARDIOGRAM REPORT: CPT | Performed by: INTERNAL MEDICINE

## 2023-01-01 PROCEDURE — 97116 GAIT TRAINING THERAPY: CPT | Mod: GP | Performed by: PHYSICAL THERAPIST

## 2023-01-01 PROCEDURE — 82374 ASSAY BLOOD CARBON DIOXIDE: CPT | Performed by: INTERNAL MEDICINE

## 2023-01-01 PROCEDURE — 84145 PROCALCITONIN (PCT): CPT | Performed by: INTERNAL MEDICINE

## 2023-01-01 PROCEDURE — 84300 ASSAY OF URINE SODIUM: CPT | Performed by: INTERNAL MEDICINE

## 2023-01-01 PROCEDURE — 94640 AIRWAY INHALATION TREATMENT: CPT | Mod: 76

## 2023-01-01 PROCEDURE — 97530 THERAPEUTIC ACTIVITIES: CPT | Mod: GP | Performed by: PHYSICAL THERAPIST

## 2023-01-01 PROCEDURE — 99239 HOSP IP/OBS DSCHRG MGMT >30: CPT | Performed by: INTERNAL MEDICINE

## 2023-01-01 PROCEDURE — 250N000011 HC RX IP 250 OP 636: Mod: JZ | Performed by: INTERNAL MEDICINE

## 2023-01-01 PROCEDURE — 99222 1ST HOSP IP/OBS MODERATE 55: CPT | Performed by: INTERNAL MEDICINE

## 2023-01-01 PROCEDURE — 250N000011 HC RX IP 250 OP 636: Mod: JZ | Performed by: STUDENT IN AN ORGANIZED HEALTH CARE EDUCATION/TRAINING PROGRAM

## 2023-01-01 PROCEDURE — 82310 ASSAY OF CALCIUM: CPT | Performed by: INTERNAL MEDICINE

## 2023-01-01 PROCEDURE — 99223 1ST HOSP IP/OBS HIGH 75: CPT | Performed by: NURSE PRACTITIONER

## 2023-01-01 PROCEDURE — 97530 THERAPEUTIC ACTIVITIES: CPT | Mod: GO

## 2023-01-01 PROCEDURE — 82565 ASSAY OF CREATININE: CPT | Performed by: INTERNAL MEDICINE

## 2023-01-01 RX ORDER — BENZONATATE 100 MG/1
100 CAPSULE ORAL 3 TIMES DAILY PRN
Status: DISCONTINUED | OUTPATIENT
Start: 2023-01-01 | End: 2023-01-01 | Stop reason: HOSPADM

## 2023-01-01 RX ORDER — NALOXONE HYDROCHLORIDE 0.4 MG/ML
0.2 INJECTION, SOLUTION INTRAMUSCULAR; INTRAVENOUS; SUBCUTANEOUS
Status: DISCONTINUED | OUTPATIENT
Start: 2023-01-01 | End: 2023-01-01 | Stop reason: HOSPADM

## 2023-01-01 RX ORDER — PREGABALIN 75 MG/1
150 CAPSULE ORAL DAILY
Status: DISCONTINUED | OUTPATIENT
Start: 2023-01-01 | End: 2023-01-01 | Stop reason: HOSPADM

## 2023-01-01 RX ORDER — METOPROLOL TARTRATE 25 MG/1
12.5 TABLET, FILM COATED ORAL 2 TIMES DAILY
Qty: 90 TABLET | Refills: 0 | Status: SHIPPED | OUTPATIENT
Start: 2023-01-01 | End: 2023-09-27

## 2023-01-01 RX ORDER — FUROSEMIDE 20 MG
20 TABLET ORAL DAILY
COMMUNITY

## 2023-01-01 RX ORDER — HEPARIN SODIUM,PORCINE 10 UNIT/ML
5-10 VIAL (ML) INTRAVENOUS EVERY 24 HOURS
Status: DISCONTINUED | OUTPATIENT
Start: 2023-01-01 | End: 2023-01-01 | Stop reason: HOSPADM

## 2023-01-01 RX ORDER — AMIODARONE HYDROCHLORIDE 200 MG/1
200 TABLET ORAL DAILY
Status: DISCONTINUED | OUTPATIENT
Start: 2023-01-01 | End: 2023-01-01 | Stop reason: HOSPADM

## 2023-01-01 RX ORDER — SODIUM CHLORIDE 9 MG/ML
INJECTION, SOLUTION INTRAVENOUS CONTINUOUS
Status: DISCONTINUED | OUTPATIENT
Start: 2023-01-01 | End: 2023-01-01

## 2023-01-01 RX ORDER — METOPROLOL TARTRATE 50 MG
50 TABLET ORAL 2 TIMES DAILY
Status: ON HOLD | COMMUNITY
End: 2023-01-01

## 2023-01-01 RX ORDER — PANTOPRAZOLE SODIUM 40 MG/1
40 TABLET, DELAYED RELEASE ORAL
Status: DISCONTINUED | OUTPATIENT
Start: 2023-01-01 | End: 2023-01-01 | Stop reason: HOSPADM

## 2023-01-01 RX ORDER — CEFEPIME HYDROCHLORIDE 2 G/1
2 INJECTION, POWDER, FOR SOLUTION INTRAVENOUS EVERY 8 HOURS
Status: DISCONTINUED | OUTPATIENT
Start: 2023-01-01 | End: 2023-01-01

## 2023-01-01 RX ORDER — FINASTERIDE 5 MG/1
5 TABLET, FILM COATED ORAL DAILY
Status: DISCONTINUED | OUTPATIENT
Start: 2023-01-01 | End: 2023-01-01 | Stop reason: HOSPADM

## 2023-01-01 RX ORDER — METHYLPHENIDATE HYDROCHLORIDE 5 MG/1
5 TABLET ORAL EVERY MORNING
Status: DISCONTINUED | OUTPATIENT
Start: 2023-01-01 | End: 2023-01-01 | Stop reason: HOSPADM

## 2023-01-01 RX ORDER — PREGABALIN 100 MG/1
100 CAPSULE ORAL DAILY
Status: DISCONTINUED | OUTPATIENT
Start: 2023-01-01 | End: 2023-01-01 | Stop reason: HOSPADM

## 2023-01-01 RX ORDER — FUROSEMIDE 20 MG
20 TABLET ORAL DAILY
Status: DISCONTINUED | OUTPATIENT
Start: 2023-01-01 | End: 2023-01-01 | Stop reason: HOSPADM

## 2023-01-01 RX ORDER — LANOLIN ALCOHOL/MO/W.PET/CERES
400 CREAM (GRAM) TOPICAL DAILY
Status: DISCONTINUED | OUTPATIENT
Start: 2023-01-01 | End: 2023-01-01 | Stop reason: HOSPADM

## 2023-01-01 RX ORDER — NALOXONE HYDROCHLORIDE 0.4 MG/ML
0.4 INJECTION, SOLUTION INTRAMUSCULAR; INTRAVENOUS; SUBCUTANEOUS
Status: DISCONTINUED | OUTPATIENT
Start: 2023-01-01 | End: 2023-01-01 | Stop reason: HOSPADM

## 2023-01-01 RX ORDER — HEPARIN SODIUM (PORCINE) LOCK FLUSH IV SOLN 100 UNIT/ML 100 UNIT/ML
5-10 SOLUTION INTRAVENOUS
Status: DISCONTINUED | OUTPATIENT
Start: 2023-01-01 | End: 2023-01-01 | Stop reason: HOSPADM

## 2023-01-01 RX ORDER — METHOCARBAMOL 750 MG/1
750 TABLET, FILM COATED ORAL 3 TIMES DAILY PRN
Status: DISCONTINUED | OUTPATIENT
Start: 2023-01-01 | End: 2023-01-01 | Stop reason: HOSPADM

## 2023-01-01 RX ORDER — DEXAMETHASONE 4 MG/1
TABLET ORAL
Status: ON HOLD | COMMUNITY
End: 2023-01-01

## 2023-01-01 RX ORDER — LURBINECTEDIN 0.5 MG/ML
INJECTION, POWDER, LYOPHILIZED, FOR SOLUTION INTRAVENOUS ONCE
COMMUNITY

## 2023-01-01 RX ORDER — CARBOXYMETHYLCELLULOSE SODIUM 5 MG/ML
1 SOLUTION/ DROPS OPHTHALMIC 4 TIMES DAILY PRN
Status: DISCONTINUED | OUTPATIENT
Start: 2023-01-01 | End: 2023-01-01 | Stop reason: HOSPADM

## 2023-01-01 RX ORDER — ACETAMINOPHEN 500 MG
500-1000 TABLET ORAL EVERY 6 HOURS PRN
Status: DISCONTINUED | OUTPATIENT
Start: 2023-01-01 | End: 2023-01-01 | Stop reason: HOSPADM

## 2023-01-01 RX ORDER — LIDOCAINE 40 MG/G
CREAM TOPICAL
Status: DISCONTINUED | OUTPATIENT
Start: 2023-01-01 | End: 2023-01-01 | Stop reason: HOSPADM

## 2023-01-01 RX ORDER — ETOPOSIDE 50 MG/1
CAPSULE ORAL
Status: ON HOLD | COMMUNITY
End: 2023-01-01

## 2023-01-01 RX ORDER — MAGNESIUM OXIDE 400 MG/1
400 TABLET ORAL 3 TIMES DAILY
Status: DISCONTINUED | OUTPATIENT
Start: 2023-01-01 | End: 2023-01-01 | Stop reason: HOSPADM

## 2023-01-01 RX ORDER — ALBUTEROL SULFATE 90 UG/1
2 AEROSOL, METERED RESPIRATORY (INHALATION) EVERY 4 HOURS PRN
Status: DISCONTINUED | OUTPATIENT
Start: 2023-01-01 | End: 2023-01-01

## 2023-01-01 RX ORDER — ASPIRIN 81 MG/1
81 TABLET ORAL DAILY
Status: DISCONTINUED | OUTPATIENT
Start: 2023-01-01 | End: 2023-01-01 | Stop reason: HOSPADM

## 2023-01-01 RX ORDER — SENNOSIDES 8.6 MG
2 TABLET ORAL
Status: DISCONTINUED | OUTPATIENT
Start: 2023-01-01 | End: 2023-01-01 | Stop reason: HOSPADM

## 2023-01-01 RX ORDER — HEPARIN SODIUM,PORCINE 10 UNIT/ML
5-10 VIAL (ML) INTRAVENOUS
Status: DISCONTINUED | OUTPATIENT
Start: 2023-01-01 | End: 2023-01-01 | Stop reason: HOSPADM

## 2023-01-01 RX ORDER — BUPRENORPHINE 5 UG/H
1 PATCH TRANSDERMAL
COMMUNITY

## 2023-01-01 RX ORDER — BUPRENORPHINE 5 UG/H
1 PATCH TRANSDERMAL WEEKLY
Status: DISCONTINUED | OUTPATIENT
Start: 2023-01-01 | End: 2023-01-01 | Stop reason: HOSPADM

## 2023-01-01 RX ORDER — OLANZAPINE 5 MG/1
5 TABLET, ORALLY DISINTEGRATING ORAL
Status: DISCONTINUED | OUTPATIENT
Start: 2023-01-01 | End: 2023-01-01

## 2023-01-01 RX ORDER — METHYLPHENIDATE HYDROCHLORIDE 5 MG/1
5 TABLET ORAL EVERY MORNING
COMMUNITY

## 2023-01-01 RX ORDER — CELECOXIB 100 MG/1
100 CAPSULE ORAL 2 TIMES DAILY PRN
Status: DISCONTINUED | OUTPATIENT
Start: 2023-01-01 | End: 2023-01-01 | Stop reason: HOSPADM

## 2023-01-01 RX ORDER — TAMSULOSIN HYDROCHLORIDE 0.4 MG/1
0.4 CAPSULE ORAL EVERY EVENING
Status: DISCONTINUED | OUTPATIENT
Start: 2023-01-01 | End: 2023-01-01 | Stop reason: HOSPADM

## 2023-01-01 RX ORDER — ASPIRIN 81 MG/1
81 TABLET ORAL DAILY
COMMUNITY

## 2023-01-01 RX ORDER — ONDANSETRON 8 MG/1
TABLET, FILM COATED ORAL
COMMUNITY

## 2023-01-01 RX ORDER — MAGNESIUM OXIDE 420 MG/1
1 TABLET ORAL 3 TIMES DAILY
COMMUNITY

## 2023-01-01 RX ORDER — CEFEPIME HYDROCHLORIDE 2 G/1
2 INJECTION, POWDER, FOR SOLUTION INTRAVENOUS EVERY 12 HOURS
Status: DISCONTINUED | OUTPATIENT
Start: 2023-01-01 | End: 2023-01-01

## 2023-01-01 RX ORDER — CEFEPIME HYDROCHLORIDE 1 G/1
1 INJECTION, POWDER, FOR SOLUTION INTRAMUSCULAR; INTRAVENOUS EVERY 12 HOURS
Status: DISCONTINUED | OUTPATIENT
Start: 2023-01-01 | End: 2023-01-01

## 2023-01-01 RX ORDER — HALOPERIDOL 5 MG/ML
2 INJECTION INTRAMUSCULAR EVERY 6 HOURS PRN
Status: DISCONTINUED | OUTPATIENT
Start: 2023-01-01 | End: 2023-01-01

## 2023-01-01 RX ORDER — LIDOCAINE 4 G/G
1 PATCH TOPICAL
Status: DISCONTINUED | OUTPATIENT
Start: 2023-01-01 | End: 2023-01-01

## 2023-01-01 RX ADMIN — FOLIC ACID TAB 400 MCG 400 MCG: 400 TAB at 10:24

## 2023-01-01 RX ADMIN — METOPROLOL TARTRATE 12.5 MG: 25 TABLET, FILM COATED ORAL at 08:52

## 2023-01-01 RX ADMIN — AMIODARONE HYDROCHLORIDE 200 MG: 200 TABLET ORAL at 10:24

## 2023-01-01 RX ADMIN — MAGNESIUM OXIDE TAB 400 MG (241.3 MG ELEMENTAL MG) 400 MG: 400 (241.3 MG) TAB at 10:31

## 2023-01-01 RX ADMIN — IPRATROPIUM BROMIDE AND ALBUTEROL 1 PUFF: 20; 100 SPRAY, METERED RESPIRATORY (INHALATION) at 12:29

## 2023-01-01 RX ADMIN — PIPERACILLIN SODIUM AND TAZOBACTAM SODIUM 3.38 G: 3; .375 INJECTION, SOLUTION INTRAVENOUS at 00:26

## 2023-01-01 RX ADMIN — ASPIRIN 81 MG: 81 TABLET, COATED ORAL at 09:16

## 2023-01-01 RX ADMIN — HEPARIN, PORCINE (PF) 10 UNIT/ML INTRAVENOUS SYRINGE 5 ML: at 22:37

## 2023-01-01 RX ADMIN — PIPERACILLIN SODIUM AND TAZOBACTAM SODIUM 3.38 G: 3; .375 INJECTION, SOLUTION INTRAVENOUS at 17:32

## 2023-01-01 RX ADMIN — IPRATROPIUM BROMIDE AND ALBUTEROL 1 PUFF: 20; 100 SPRAY, METERED RESPIRATORY (INHALATION) at 15:32

## 2023-01-01 RX ADMIN — MAGNESIUM OXIDE TAB 400 MG (241.3 MG ELEMENTAL MG) 400 MG: 400 (241.3 MG) TAB at 22:09

## 2023-01-01 RX ADMIN — METHYLPHENIDATE HYDROCHLORIDE 5 MG: 5 TABLET ORAL at 10:33

## 2023-01-01 RX ADMIN — VANCOMYCIN HYDROCHLORIDE 1500 MG: 10 INJECTION, POWDER, LYOPHILIZED, FOR SOLUTION INTRAVENOUS at 06:25

## 2023-01-01 RX ADMIN — TAMSULOSIN HYDROCHLORIDE 0.4 MG: 0.4 CAPSULE ORAL at 22:18

## 2023-01-01 RX ADMIN — AMIODARONE HYDROCHLORIDE 200 MG: 200 TABLET ORAL at 08:52

## 2023-01-01 RX ADMIN — ACETAMINOPHEN 1000 MG: 500 TABLET, FILM COATED ORAL at 17:52

## 2023-01-01 RX ADMIN — PANTOPRAZOLE SODIUM 40 MG: 40 TABLET, DELAYED RELEASE ORAL at 10:26

## 2023-01-01 RX ADMIN — IPRATROPIUM BROMIDE AND ALBUTEROL 1 PUFF: 20; 100 SPRAY, METERED RESPIRATORY (INHALATION) at 08:03

## 2023-01-01 RX ADMIN — IPRATROPIUM BROMIDE AND ALBUTEROL 1 PUFF: 20; 100 SPRAY, METERED RESPIRATORY (INHALATION) at 11:43

## 2023-01-01 RX ADMIN — CEFEPIME HYDROCHLORIDE 1 G: 1 INJECTION, POWDER, FOR SOLUTION INTRAMUSCULAR; INTRAVENOUS at 03:20

## 2023-01-01 RX ADMIN — OLANZAPINE 5 MG: 5 TABLET, ORALLY DISINTEGRATING ORAL at 17:44

## 2023-01-01 RX ADMIN — IPRATROPIUM BROMIDE AND ALBUTEROL 1 PUFF: 20; 100 SPRAY, METERED RESPIRATORY (INHALATION) at 20:06

## 2023-01-01 RX ADMIN — PREGABALIN 100 MG: 100 CAPSULE ORAL at 08:52

## 2023-01-01 RX ADMIN — HEPARIN, PORCINE (PF) 10 UNIT/ML INTRAVENOUS SYRINGE 5 ML: at 01:44

## 2023-01-01 RX ADMIN — HEPARIN SODIUM (PORCINE) LOCK FLUSH IV SOLN 100 UNIT/ML 5 ML: 100 SOLUTION at 08:53

## 2023-01-01 RX ADMIN — HEPARIN, PORCINE (PF) 10 UNIT/ML INTRAVENOUS SYRINGE 5 ML: at 09:15

## 2023-01-01 RX ADMIN — FINASTERIDE 5 MG: 5 TABLET, FILM COATED ORAL at 10:24

## 2023-01-01 RX ADMIN — ACETAMINOPHEN 1000 MG: 500 TABLET, FILM COATED ORAL at 05:02

## 2023-01-01 RX ADMIN — PANTOPRAZOLE SODIUM 40 MG: 40 TABLET, DELAYED RELEASE ORAL at 06:27

## 2023-01-01 RX ADMIN — METHYLPHENIDATE HYDROCHLORIDE 5 MG: 5 TABLET ORAL at 09:16

## 2023-01-01 RX ADMIN — PREGABALIN 150 MG: 75 CAPSULE ORAL at 10:24

## 2023-01-01 RX ADMIN — METOPROLOL TARTRATE 12.5 MG: 25 TABLET, FILM COATED ORAL at 10:24

## 2023-01-01 RX ADMIN — METHYLPHENIDATE HYDROCHLORIDE 5 MG: 5 TABLET ORAL at 08:52

## 2023-01-01 RX ADMIN — HEPARIN, PORCINE (PF) 10 UNIT/ML INTRAVENOUS SYRINGE 10 ML: at 14:57

## 2023-01-01 RX ADMIN — METHYLPHENIDATE HYDROCHLORIDE 5 MG: 5 TABLET ORAL at 10:31

## 2023-01-01 RX ADMIN — CEFEPIME 2 G: 2 INJECTION, POWDER, FOR SOLUTION INTRAVENOUS at 04:17

## 2023-01-01 RX ADMIN — METOPROLOL TARTRATE 12.5 MG: 25 TABLET, FILM COATED ORAL at 21:50

## 2023-01-01 RX ADMIN — AMIODARONE HYDROCHLORIDE 200 MG: 200 TABLET ORAL at 09:16

## 2023-01-01 RX ADMIN — METOPROLOL TARTRATE 12.5 MG: 25 TABLET, FILM COATED ORAL at 22:09

## 2023-01-01 RX ADMIN — CEFEPIME 2 G: 2 INJECTION, POWDER, FOR SOLUTION INTRAVENOUS at 16:56

## 2023-01-01 RX ADMIN — VANCOMYCIN HYDROCHLORIDE 1500 MG: 10 INJECTION, POWDER, LYOPHILIZED, FOR SOLUTION INTRAVENOUS at 06:27

## 2023-01-01 RX ADMIN — HALOPERIDOL LACTATE 2 MG: 5 INJECTION, SOLUTION INTRAMUSCULAR at 04:36

## 2023-01-01 RX ADMIN — IPRATROPIUM BROMIDE AND ALBUTEROL 1 PUFF: 20; 100 SPRAY, METERED RESPIRATORY (INHALATION) at 15:50

## 2023-01-01 RX ADMIN — QUETIAPINE FUMARATE 12.5 MG: 25 TABLET ORAL at 17:43

## 2023-01-01 RX ADMIN — AMIODARONE HYDROCHLORIDE 200 MG: 200 TABLET ORAL at 10:31

## 2023-01-01 RX ADMIN — MAGNESIUM OXIDE TAB 400 MG (241.3 MG ELEMENTAL MG) 400 MG: 400 (241.3 MG) TAB at 17:22

## 2023-01-01 RX ADMIN — ACETAMINOPHEN 1000 MG: 500 TABLET, FILM COATED ORAL at 00:37

## 2023-01-01 RX ADMIN — SODIUM CHLORIDE: 9 INJECTION, SOLUTION INTRAVENOUS at 18:18

## 2023-01-01 RX ADMIN — ASPIRIN 81 MG: 81 TABLET, COATED ORAL at 08:52

## 2023-01-01 RX ADMIN — TAMSULOSIN HYDROCHLORIDE 0.4 MG: 0.4 CAPSULE ORAL at 21:50

## 2023-01-01 RX ADMIN — MAGNESIUM OXIDE TAB 400 MG (241.3 MG ELEMENTAL MG) 400 MG: 400 (241.3 MG) TAB at 09:15

## 2023-01-01 RX ADMIN — PIPERACILLIN SODIUM AND TAZOBACTAM SODIUM 3.38 G: 3; .375 INJECTION, SOLUTION INTRAVENOUS at 11:02

## 2023-01-01 RX ADMIN — ASPIRIN 81 MG: 81 TABLET, COATED ORAL at 10:24

## 2023-01-01 RX ADMIN — ASPIRIN 81 MG: 81 TABLET, COATED ORAL at 10:31

## 2023-01-01 RX ADMIN — SENNOSIDES 2 TABLET: 8.6 TABLET, FILM COATED ORAL at 01:24

## 2023-01-01 RX ADMIN — SODIUM CHLORIDE 1000 ML: 9 INJECTION, SOLUTION INTRAVENOUS at 03:00

## 2023-01-01 RX ADMIN — MAGNESIUM OXIDE TAB 400 MG (241.3 MG ELEMENTAL MG) 400 MG: 400 (241.3 MG) TAB at 08:52

## 2023-01-01 RX ADMIN — OLANZAPINE 5 MG: 5 TABLET, ORALLY DISINTEGRATING ORAL at 18:05

## 2023-01-01 RX ADMIN — PIPERACILLIN SODIUM AND TAZOBACTAM SODIUM 3.38 G: 3; .375 INJECTION, SOLUTION INTRAVENOUS at 16:46

## 2023-01-01 RX ADMIN — PREGABALIN 100 MG: 100 CAPSULE ORAL at 10:31

## 2023-01-01 RX ADMIN — FOLIC ACID TAB 400 MCG 400 MCG: 400 TAB at 09:15

## 2023-01-01 RX ADMIN — FUROSEMIDE 20 MG: 20 TABLET ORAL at 17:47

## 2023-01-01 RX ADMIN — SODIUM CHLORIDE: 9 INJECTION, SOLUTION INTRAVENOUS at 04:17

## 2023-01-01 RX ADMIN — ACETAMINOPHEN 1000 MG: 500 TABLET, FILM COATED ORAL at 14:16

## 2023-01-01 RX ADMIN — FUROSEMIDE 20 MG: 20 TABLET ORAL at 08:52

## 2023-01-01 RX ADMIN — PIPERACILLIN SODIUM AND TAZOBACTAM SODIUM 3.38 G: 3; .375 INJECTION, SOLUTION INTRAVENOUS at 05:08

## 2023-01-01 RX ADMIN — MAGNESIUM OXIDE TAB 400 MG (241.3 MG ELEMENTAL MG) 400 MG: 400 (241.3 MG) TAB at 17:43

## 2023-01-01 RX ADMIN — HEPARIN, PORCINE (PF) 10 UNIT/ML INTRAVENOUS SYRINGE 5 ML: at 14:16

## 2023-01-01 RX ADMIN — QUETIAPINE FUMARATE 12.5 MG: 25 TABLET ORAL at 17:22

## 2023-01-01 RX ADMIN — FINASTERIDE 5 MG: 5 TABLET, FILM COATED ORAL at 08:52

## 2023-01-01 RX ADMIN — METOPROLOL TARTRATE 12.5 MG: 25 TABLET, FILM COATED ORAL at 09:15

## 2023-01-01 RX ADMIN — FUROSEMIDE 20 MG: 20 TABLET ORAL at 09:17

## 2023-01-01 RX ADMIN — METHOCARBAMOL 750 MG: 750 TABLET ORAL at 14:15

## 2023-01-01 RX ADMIN — TAMSULOSIN HYDROCHLORIDE 0.4 MG: 0.4 CAPSULE ORAL at 20:00

## 2023-01-01 RX ADMIN — PANTOPRAZOLE SODIUM 40 MG: 40 TABLET, DELAYED RELEASE ORAL at 10:31

## 2023-01-01 RX ADMIN — ACETAMINOPHEN 1000 MG: 500 TABLET, FILM COATED ORAL at 06:32

## 2023-01-01 RX ADMIN — PREGABALIN 100 MG: 100 CAPSULE ORAL at 09:16

## 2023-01-01 RX ADMIN — ACETAMINOPHEN 1000 MG: 500 TABLET, FILM COATED ORAL at 14:23

## 2023-01-01 RX ADMIN — FINASTERIDE 5 MG: 5 TABLET, FILM COATED ORAL at 10:31

## 2023-01-01 RX ADMIN — PIPERACILLIN SODIUM AND TAZOBACTAM SODIUM 3.38 G: 3; .375 INJECTION, SOLUTION INTRAVENOUS at 05:04

## 2023-01-01 RX ADMIN — AMOXICILLIN AND CLAVULANATE POTASSIUM 1 TABLET: 875; 125 TABLET, FILM COATED ORAL at 10:41

## 2023-01-01 RX ADMIN — PANTOPRAZOLE SODIUM 40 MG: 40 TABLET, DELAYED RELEASE ORAL at 08:52

## 2023-01-01 RX ADMIN — MAGNESIUM OXIDE TAB 400 MG (241.3 MG ELEMENTAL MG) 400 MG: 400 (241.3 MG) TAB at 21:55

## 2023-01-01 RX ADMIN — FINASTERIDE 5 MG: 5 TABLET, FILM COATED ORAL at 09:15

## 2023-01-01 RX ADMIN — METHOCARBAMOL 750 MG: 750 TABLET ORAL at 00:37

## 2023-01-01 RX ADMIN — PIPERACILLIN SODIUM AND TAZOBACTAM SODIUM 3.38 G: 3; .375 INJECTION, SOLUTION INTRAVENOUS at 22:20

## 2023-01-01 RX ADMIN — FOLIC ACID TAB 400 MCG 400 MCG: 400 TAB at 10:32

## 2023-01-01 RX ADMIN — MAGNESIUM OXIDE TAB 400 MG (241.3 MG ELEMENTAL MG) 400 MG: 400 (241.3 MG) TAB at 16:56

## 2023-01-01 RX ADMIN — FOLIC ACID TAB 400 MCG 400 MCG: 400 TAB at 08:52

## 2023-01-01 RX ADMIN — IPRATROPIUM BROMIDE AND ALBUTEROL 1 PUFF: 20; 100 SPRAY, METERED RESPIRATORY (INHALATION) at 19:53

## 2023-01-01 RX ADMIN — METOPROLOL TARTRATE 12.5 MG: 25 TABLET, FILM COATED ORAL at 10:32

## 2023-01-01 RX ADMIN — METOPROLOL TARTRATE 12.5 MG: 25 TABLET, FILM COATED ORAL at 20:00

## 2023-01-01 RX ADMIN — MAGNESIUM OXIDE TAB 400 MG (241.3 MG ELEMENTAL MG) 400 MG: 400 (241.3 MG) TAB at 10:23

## 2023-01-01 RX ADMIN — MAGNESIUM OXIDE TAB 400 MG (241.3 MG ELEMENTAL MG) 400 MG: 400 (241.3 MG) TAB at 21:50

## 2023-01-01 ASSESSMENT — ACTIVITIES OF DAILY LIVING (ADL)
ADLS_ACUITY_SCORE: 42
ADLS_ACUITY_SCORE: 44
ADLS_ACUITY_SCORE: 34
ADLS_ACUITY_SCORE: 44
ADLS_ACUITY_SCORE: 45
ADLS_ACUITY_SCORE: 42
ADLS_ACUITY_SCORE: 44
ADLS_ACUITY_SCORE: 40
ADLS_ACUITY_SCORE: 42
PREVIOUS_RESPONSIBILITIES: MEAL PREP;HOUSEKEEPING;LAUNDRY;SHOPPING;YARDWORK;MEDICATION MANAGEMENT;FINANCES;DRIVING
ADLS_ACUITY_SCORE: 35
ADLS_ACUITY_SCORE: 42
ADLS_ACUITY_SCORE: 45
ADLS_ACUITY_SCORE: 44
ADLS_ACUITY_SCORE: 42
ADLS_ACUITY_SCORE: 44
ADLS_ACUITY_SCORE: 42
ADLS_ACUITY_SCORE: 34
ADLS_ACUITY_SCORE: 44
ADLS_ACUITY_SCORE: 44
ADLS_ACUITY_SCORE: 40
ADLS_ACUITY_SCORE: 35
ADLS_ACUITY_SCORE: 40
ADLS_ACUITY_SCORE: 44
ADLS_ACUITY_SCORE: 42
ADLS_ACUITY_SCORE: 44
ADLS_ACUITY_SCORE: 44
ADLS_ACUITY_SCORE: 40
ADLS_ACUITY_SCORE: 35
ADLS_ACUITY_SCORE: 44
DEPENDENT_IADLS:: INDEPENDENT
ADLS_ACUITY_SCORE: 44
ADLS_ACUITY_SCORE: 41
ADLS_ACUITY_SCORE: 44
ADLS_ACUITY_SCORE: 42
ADLS_ACUITY_SCORE: 42
ADLS_ACUITY_SCORE: 44
ADLS_ACUITY_SCORE: 44
ADLS_ACUITY_SCORE: 40
ADLS_ACUITY_SCORE: 42
ADLS_ACUITY_SCORE: 41
ADLS_ACUITY_SCORE: 44
ADLS_ACUITY_SCORE: 44

## 2023-06-25 NOTE — TELEPHONE ENCOUNTER
"Direct admission request from provider Dr. Salter in ED at [place]. Re. [facility]     Patient name: Mulugeta Castillo, MRN: [id number]    Requesting card tele bed for \"hyperkalemia, EDITH, confusion\".    This is an 81 yo M with PMhx of left lung adenocarcinoma on chemotherapy, PAF, CAD, heart failure, who presented to the ED for evaluation of altered mental status.   Was more drowsy altered since PM of 6/24 - this week new pain meds started which they feel may be the issue.   [facility] and p.o. chemo midweek also given from of [facility].     In the ED Afebrile, 97% on 4 LPM.  No respiratory distress.  Blood pressure and pulse stable.  Drowsy but alert, follows commands nonfocal neurologic exam and noncontrast head CT negative for acute pathology.  He does complain of left pleuritic sided chest pain, no dyspnea. Not new chest pain.   Exam notable for peripheral edema.    Labs-  WBC 71k remainder cell counts are normal. EDITH cr 1.81, last able to review in January 1.04. normal bicarb, glucose, calcium. LFTS fine.  Hyperkalemia and shifted with recheck of K 4.9 was (6.5). lactate normal. Trop negative and EKG NSR no acute ST elevations.   Infectious work-up with chest x-ray and cultures taken.  No GI symptoms.  Waiting on urine culture.  They are attempting to get ultrasound-guided IV access for CT chest PE rule out with plans to follow up regarding these results and reassess prior to transfer.     Hospital requesting for IV fluid rehydration, monitor for improvement in mental status with adjustment in pain medications, unclear clinical significance of leukocytosis, rehydration and electrolyte monitoring.    Will reassess in completion of work-up and likely accept for admit under cardiac telemetry bed, Dr. Guillen.    Bedside attendant: no.  Contact or Isolation precautions: chemo.       "

## 2023-06-26 PROBLEM — G93.40 ENCEPHALOPATHY: Status: ACTIVE | Noted: 2023-01-01

## 2023-06-26 NOTE — PLAN OF CARE
"11:50 Provider notification: Text page sent to MD, \"Pt has a chest port.  There currently are no flushing or heparin lock orders.  Would it be ok to initiate those? Thank you\"    11:51 MD said ok to initiate vascular access/flushing protocol for chest port.    15:01 Shift Summary: Pt alert and oriented to self. Confused and forgetful.  Wife at bedside.   Intermittent pain in ribs.  PRN tylenol given. Able to stand and pivot to chair with assist of 1-2 and gait belt. Up to recliner chair this morning and afternoon. Tele: SR.  Regular diet.  Incont bladder at times.  Uses urinal.  Has chest port  Heparin locked and 1 piv saline locked. Continue vanco and cefepime per orders.      "

## 2023-06-26 NOTE — PHARMACY-VANCOMYCIN DOSING SERVICE
Pharmacy Vancomycin Initial Note  Date of Service 2023  Patient's  1940  82 year old, male    Indication: Sepsis    Current estimated CrCl = Estimated Creatinine Clearance: 51.6 mL/min (A) (based on SCr of 1.29 mg/dL (H)).    Creatinine for last 3 days  2023: 12:36 AM Creatinine 1.44 mg/dL;  6:52 AM Creatinine 1.29 mg/dL    Recent Vancomycin Level(s) for last 3 days  No results found for requested labs within last 3 days.      Vancomycin IV Administrations (past 72 hours)                   vancomycin (VANCOCIN) 1,500 mg in 0.9% NaCl 250 mL intermittent infusion (mg) 1,500 mg New Bag 23 0625                Nephrotoxins and other renal medications (From now, onward)    None          Contrast Orders - past 72 hours (72h ago, onward)    None          [employer] Prediction of Planned Initial Vancomycin Regimen  Regimen: 1500 mg IV every 24 hours.  Start time: 06:30 on 2023  Exposure target: AUC24 (range)400-600 mg/L.hr   AUC24,ss: 571 mg/L.hr  Probability of AUC24 > 400: 85 %  Ctrough,ss: 17.9 mg/L  Probability of Ctrough,ss > 20: 40 %  Probability of nephrotoxicity (Lodise YOSSI ): 14 %        Plan:  1. Start vancomycin  1500 mg IV q24h tomorrow at 0630. Note: Patient received a 2000mg loading dose  at 1614 at OSH and 1500mg this morning at 0625.   2. Vancomycin monitoring method: AUC  3. Vancomycin therapeutic monitoring goal: 400-600 mg*h/L  4. Pharmacy will check vancomycin levels as appropriate in 1-3 Days.    5. Serum creatinine levels will be ordered every 48 hours.        Juan Valero, Pharm.D., BCPS

## 2023-06-26 NOTE — PROGRESS NOTES
[facility]    Medicine Progress Note - Hospitalist Service    Date of Admission:  6/25/2023    Assessment & Plan   Mulugeta Castillo is a 82 year old male came to attention on 6/25/2023 due to increased somnolence.  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  He has been on multiple chemotherapeutic agents and most recently started on Zepzelca just a couple of days ago.    He initially presented to the [facility] emergency department where he was assessed and found to be hypoxic with a significantly white cell count, acute kidney injury and hyperkalemia.  He was treated for the hyperkalemia and started on broad-spectrum antibiotics after blood and urine cultures were obtained.  [facility], the patient was admitted to [facility].    [facility] but nothing has come yet today.    Diagnoses:  1.  Acute hypoxic respiratory failure.  Suspected post-obstructive pneumonia  2.  Somnolence/encephalopathy.  Ms. Castillo came in and confirmed that she feels the patient is not yet to his baseline.  He apparently was having some hallucinations also.  3.  Acute renal failure with hyperkalemia.  The high K was managed while patient was at the [place] emergency department.  EDITH is common with the Zepzelka which the patient just started last Thursday.  4.  Chronic left-sided chest pain associated with left-sided lung cancer for which the patient is on buprenorphine and pregabablin.    5.  Atrial fibrillation for which patient is on amiodarone.  6.  EDITH with hyperkalemia, improving.   7.  Marked leukocytosis.  This is almost certainly due to exposure to G-CSF which was just administered on Saturday.  8.  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  Previous gamma knife surgery for brain metastases.  9.  Hypertension      PLAN:  1.   "Continue with empiric cefepime at this point.  2.  I requested records from the patient's oncologist at [facility].  3.  Initiate Zyprexa for hallucinations.  4.  Patient appears to be euvolemic at this time.  FeNa is 1.3 suggesting that he is not prerenal.  We will continue with low volume maintenance therapy.       Diet: Combination Diet Regular Diet Adult    DVT Prophylaxis: Pneumatic Compression Devices  Chiang Catheter: Not present  Lines: p  Port a Cath 06/26/23 Single Lumen Right Chest wall-Site Assessment: WDL except;Pink      Cardiac Monitoring: ACTIVE order. Indication: Electrolyte Imbalance (24 hours)- Magnesium <1.3 mg/ml; Potassium < =2.8 or > 5.5 mg/ml  Code Status: Full Code      Clinically Significant Risk Factors Present on Admission        # Hyperkalemia: Highest K = 5.4 mmol/L in last 2 days, will monitor as appropriate         # Drug Induced Platelet Defect: home medication list includes an antiplatelet medication     # Acute Respiratory Failure: Documented O2 saturation < 91%.  Continue supplemental oxygen as needed              Disposition Plan      Expected Discharge Date: 06/28/2023                  Taran Saldana MD  Hospitalist Service  [facility]  Securely message with [employer] (more info)  Text page via [employer] Paging/Directory   ______________________________________________________________________    Interval History   Chart reviewed, pt interviewed.       Became \"shakey\" yesterday in legs or arms started Sunday am around 4 am.  He was able to walk until he became intensely uncomfortable.  Chilled at the time.  With this he was ultimately unable to walk and became more and more confused. He reportedly asked multiple times about the date.     Son was also around yesterday due to the concern of pt's wife. He was helpful in getting the pt to the ED.     Pt started a new chemo this past week because he had progressed on Etoposide.  Started Zepzelca on Thursday.  Received G-CSF " med from wife on Saturday.    Gamma knife to brain about 2 years ago.     At baseline has a cough (takes banzonatate if needed).    Was hallucinating at the ED yesterday. Seeing people, baby, puff of smoke.    Physical Exam   Vital Signs: Temp: 98  F (36.7  C) Temp src: Oral BP: (!) 134/34 Pulse: 82   Resp: 18 SpO2: 99 % O2 Device: Oxymizer cannula Oxygen Delivery: 4 LPM  Weight: 214 lbs 8.12 oz    Constitutional: Alert when his wife is around.  He seemed more interactive and appropriate with her present.  Otherwise he seemed to fade in and out when I was talking to him without her.  Eyes: extra-ocular muscles intact and sclera clear  Respiratory: Rhonchorous breathing noted particularly on the right.  Good air entry.  No dominic rales or egophony.  No wheezing.  Cardiovascular: regular rate and rhythm and no murmur noted  GI: Soft, nontender except for some tenderness in the right upper quadrant just off the midline.  There seems to be some fullness in this area.  Skin: no bruising or bleeding and normal skin color, texture, turgor  Musculoskeletal: bilat LE edema    Medical Decision Making       55 MINUTES SPENT BY ME on the date of service doing chart review, history, exam, documentation & further activities per the note.      Data   Results for orders placed or performed during the hospital encounter of 06/25/23 (from the past 24 hour(s))   Basic metabolic panel   Result Value Ref Range    Sodium 135 (L) 136 - 145 mmol/L    Potassium 5.4 (H) 3.4 - 5.3 mmol/L    Chloride 98 98 - 107 mmol/L    Carbon Dioxide (CO2) 25 22 - 29 mmol/L    Anion Gap 12 7 - 15 mmol/L    Urea Nitrogen 32.0 (H) 8.0 - 23.0 mg/dL    Creatinine 1.44 (H) 0.67 - 1.17 mg/dL    Calcium 9.4 8.8 - 10.2 mg/dL    Glucose 95 70 - 99 mg/dL    GFR Estimate 48 (L) >60 mL/min/1.73m2   Procalcitonin   Result Value Ref Range    Procalcitonin 7.95 (HH) <0.05 ng/mL   CBC with Platelets & Differential    Narrative    The following orders were created for panel  "order CBC with Platelets & Differential.  Procedure                               Abnormality         Status                     ---------                               -----------         ------                     CBC with platelets and d...[[id number]]  Abnormal            Final result               Manual Differential[[id number]]          Abnormal            Final result                 Please view results for these tests on the individual orders.   CBC with platelets and differential   Result Value Ref Range    WBC Count 84.4 (HH) 4.0 - 11.0 10e3/uL    RBC Count 2.18 (L) 4.40 - 5.90 10e6/uL    Hemoglobin 8.3 (L) 13.3 - 17.7 g/dL    Hematocrit 25.5 (L) 40.0 - 53.0 %     (H) 78 - 100 fL    MCH 38.1 (H) 26.5 - 33.0 pg    MCHC 32.5 31.5 - 36.5 g/dL    RDW 16.8 (H) 10.0 - 15.0 %    Platelet Count 150 150 - 450 10e3/uL    Narrative    Previously reported component [ NRBCs ] is no longer reported.\"  Previously reported component [ NRBCs Absolute ] is no longer reported.\"   Manual Differential   Result Value Ref Range    % Neutrophils 95 %    % Lymphocytes 4 %    % Monocytes 1 %    % Eosinophils 0 %    % Basophils 0 %    Absolute Neutrophils 80.2 (H) 1.6 - 8.3 10e3/uL    Absolute Lymphocytes 3.4 0.8 - 5.3 10e3/uL    Absolute Monocytes 0.8 0.0 - 1.3 10e3/uL    Absolute Eosinophils 0.0 0.0 - 0.7 10e3/uL    Absolute Basophils 0.0 0.0 - 0.2 10e3/uL    RBC Morphology Confirmed RBC Indices     Platelet Assessment  Automated Count Confirmed. Platelet morphology is normal.     Automated Count Confirmed. Platelet morphology is normal.   Lab Blood Morphology Pathologist Review    Narrative    The following orders were created for panel order Lab Blood Morphology Pathologist Review.  Procedure                               Abnormality         Status                     ---------                               -----------         ------                     Bld morphology pathology...[[id number]]                      Final " result               CBC with platelets and d...[[id number]]                                                 Reticulocyte count[[id number]]           Abnormal            Final result               Morphology Tracking[[id number]]                              Final result                 Please view results for these tests on the individual orders.   d morphology pathology review   Result Value Ref Range    Final Diagnosis       Peripheral blood, morphology:  - Marked leukocytosis with neutrophilia.  - Marked anemia, macrocytic and normochromic, with nonspecific anisopoikilocytosis.  - Platelets quantitatively within normal limits and without diagnostic morphologic abnormality.  - Negative for schistocytes or definitive morphologic features of hemolysis.  - Negative for overt features of myelodysplasia or circulating blasts.    See comment.    COMMENT:  Reactive-appearing neutrophilia is nonspecific and may be seen with underlying infection, acute physiologic stress, autoimmune or inflammatory conditions, granulocyte colony-stimulating factor (GCSF) therapy (including filgrastim), and/or corticosteroid therapy.  Macrocytic anemia is nonspecific and may be associated with nutritional deficiencies (including vitamin B12 and/or folate), various medications, blood loss, hypothyroidism, alcohol use, liver disease, and/or underlying myelodysplastic neoplasm (myelodysplastic syndrome).  Obtaining vitamin B12 and folate levels may be considered (if not already performed).  Clinical correlation is recommended.      Peripheral Smear       A microscopic examination is performed.       Peripheral Hematologic Data        Latest Reference Range & Units 06/26/23 01:26   WBC 4.0 - 11.0 10e3/uL 84.4 (HH)   Hemoglobin 13.3 - 17.7 g/dL 8.3 (L)   Hematocrit 40.0 - 53.0 % 25.5 (L)   Platelet Count 150 - 450 10e3/uL 150   RBC Count 4.40 - 5.90 10e6/uL 2.18 (L)   MCV 78 - 100 fL 117 (H)   MCH 26.5 - 33.0 pg 38.1 (H)   MCHC 31.5 - 36.5 g/dL  32.5   RDW 10.0 - 15.0 % 16.8 (H)   % Neutrophils % 95   % Lymphocytes % 4   % Monocytes % 1   % Eosinophils % 0   % Basophils % 0   Absolute Basophils 0.0 - 0.2 10e3/uL 0.0   Absolute Neutrophil 1.6 - 8.3 10e3/uL 80.2 (H)   Absolute Lymphocytes 0.8 - 5.3 10e3/uL 3.4   Absolute Monocytes 0.0 - 1.3 10e3/uL 0.8   Absolute Eosinophils 0.0 - 0.7 10e3/uL 0.0   % Retic 0.5 - 2.0 % 4.0 (H)   Absolute Retic 0.025 - 0.095 10e6/uL 0.092   (HH): Data is critically high  (L): Data is abnormally low  (H): Data is abnormally high    For patients over 18 years old, anemia and quantitative abnormalities of WBC and platelets (if present) may be further stratified as follows:    Hemoglobin (g/dL) in males:    11.3 - 13.2: Mild anemia    9.3 - 11.2: Moderate anemia    Less than 9.3: Marked anemia    WBC (10^9/L):    Greater than 50.0: Marked leukocytosis    18.0 - 50.0: Moderate leukocytosis    11.1 - 17.9: Mild leukocytosis    3.0 - 3.9: Mild leukopenia    2.0 - 2.9: Moderate leukopenia    Less than 2.0: Marked leukopenia    Platelets (10^9/L):    Greater than 900: Marked thrombocytosis    601 - 900: Moderate thrombocytosis    451 - 600: Mild thrombocytosis    100 - 149: Mild thrombocytopenia    50 - 99: Moderate thrombocytopenia    Less than 50: Marked thrombocytopenia       Performing Labs       The technical component of this testing was completed at [facility], [facility] and [facility]     Reticulocyte count   Result Value Ref Range    % Reticulocyte 4.0 (H) 0.5 - 2.0 %    Absolute Reticulocyte 0.092 0.025 - 0.095 10e6/uL   Fractional excretion of sodium    Narrative    The following orders were created for panel order Fractional excretion of sodium.  Procedure                               Abnormality         Status                     ---------                               -----------         ------                     Fractional  Excretion of ...[[id number]]                      Final result                 Please view results for these tests on the individual orders.   Fractional Excretion of Sodium   Result Value Ref Range    Creatinine Urine mg/dL 58.0 mg/dL    Sodium Urine mmol/L 69 mmol/L    %FENA 1.3 %   Basic metabolic panel   Result Value Ref Range    Sodium 137 136 - 145 mmol/L    Potassium 5.1 3.4 - 5.3 mmol/L    Chloride 102 98 - 107 mmol/L    Carbon Dioxide (CO2) 27 22 - 29 mmol/L    Anion Gap 8 7 - 15 mmol/L    Urea Nitrogen 27.1 (H) 8.0 - 23.0 mg/dL    Creatinine 1.29 (H) 0.67 - 1.17 mg/dL    Calcium 9.3 8.8 - 10.2 mg/dL    Glucose 113 (H) 70 - 99 mg/dL    GFR Estimate 55 (L) >60 mL/min/1.73m2   CBC with platelets   Result Value Ref Range    WBC Count 77.4 (HH) 4.0 - 11.0 10e3/uL    RBC Count 2.25 (L) 4.40 - 5.90 10e6/uL    Hemoglobin 8.3 (L) 13.3 - 17.7 g/dL    Hematocrit 27.6 (L) 40.0 - 53.0 %     (H) 78 - 100 fL    MCH 36.9 (H) 26.5 - 33.0 pg    MCHC 30.1 (L) 31.5 - 36.5 g/dL    RDW 17.0 (H) 10.0 - 15.0 %    Platelet Count 163 150 - 450 10e3/uL   RBC and Platelet Morphology   Result Value Ref Range    Platelet Assessment  Automated Count Confirmed. Platelet morphology is normal.     Automated Count Confirmed. Platelet morphology is normal.    RBC Morphology Confirmed RBC Indices    Potassium   Result Value Ref Range    Potassium 5.0 3.4 - 5.3 mmol/L

## 2023-06-26 NOTE — CONSULTS
[facility]    Infectious Disease Consultation     Date of Admission:  6/25/2023  Date of Consult (When I saw the patient): 06/26/23    Assessment & Plan   Mulugeta Castillo is a 82 year old male who was admitted on 6/25/2023.     Impression: 1 82-year-old male with underlying lung cancer, port in [facility] and [facility], presents with recurrent rigors over the last couple of days, multiple signs of probable infection, blood cultures so far negative in the [facility] system other clear focal infection site  2 lung cancer on chemotherapy with a port in place, major leukemoid reaction did get a dose of Neupogen  3 acute renal insufficiency  4 prior bladder cancer    REC 1 continue broad coverage await cultures from the [facility] system, follow closely further work-up as clinical course directs        Rodríguez Dangelo MD    Reason for Consult   Reason for consult: I was asked to evaluate this patient for sepsis.    Primary Care Physician   Roopa Hong    Chief Complaint   Shaking chills    History is obtained from the patient and medical records    History of Present Illness   Mulugeta Castillo is a 82 year old male who presents with lung [facility], metastatic disease, no major infection complications.  Just got a new chemo cycle started last week and appears to have gotten a Neupogen dose his white count is very elevated.  Over the last several days has had multiple episodes of what sound like rigors.  Does not have any obvious localizing infection symptoms may be slight hypoxia but no major infiltrates.  All cultures are pending.    Past Medical History   I have reviewed this patient's medical history and updated it with pertinent information if needed.   Past Medical History:   Diagnosis Date     Lung cancer (H)      Metastasis to brain (H)        Past Surgical History   I have reviewed this patient's surgical history and updated it with pertinent information if needed.  Past  Surgical History:   Procedure Laterality Date     ENDOBRONCHIAL ULTRASOUND FLEXIBLE N/A 3/31/2021    Procedure: Flexible Bronchoscopy, Airway Exam, Therapeutic Suctioning, Esophagogastroduodenoscopy;  Surgeon: Hema Barajas MD;  Location: [facility] OR       Prior to Admission Medications   Prior to Admission Medications   Prescriptions Last Dose Informant Patient Reported? Taking?   Garlic Oil 1000 MG CAPS 6/24/2023 at AM  Yes Yes   Sig: Take 1 capsule by mouth daily   Magnesium Oxide 420 MG TABS 6/24/2023 at x3  Yes Yes   Sig: Take 1 tablet by mouth 3 times daily   acetaminophen (TYLENOL) 500 MG tablet 6/24/2023 at x3  Yes Yes   Sig: Take 1,000 mg by mouth every 6 hours as needed   albuterol (PROAIR HFA/PROVENTIL HFA/VENTOLIN HFA) 108 (90 Base) MCG/ACT inhaler Unknown  Yes Yes   Sig: Inhale 2 puffs into the lungs   amiodarone (PACERONE) 200 MG tablet 6/24/2023 at AM  Yes Yes   Sig: Take 200 mg by mouth daily   aspirin 81 MG EC tablet 6/24/2023 at AM  Yes Yes   Sig: Take 81 mg by mouth daily   buprenorphine (BUTRANS) 5 MCG/HR WK patch 6/23/2023 at L torso per pt  Yes Yes   Sig: Place 1 patch onto the skin every 7 days on Fridays   carboxymethylcellulose PF (REFRESH PLUS) 0.5 % ophthalmic solution 6/24/2023  Yes Yes   Sig: Place 1 drop into both eyes 4 times daily as needed for dry eyes   celecoxib (CELEBREX) 100 MG capsule 6/24/2023 at x2  Yes Yes   Sig: Take 100 mg by mouth 2 times daily as needed for pain   diclofenac (VOLTAREN) 1 % topical gel 6/24/2023 at x1  Yes Yes   Sig: Apply 4 g topically 4 times daily as needed for moderate pain to left side/back   finasteride (PROSCAR) 5 MG tablet 6/24/2023 at AM  Yes Yes   Sig: Take 5 mg by mouth daily   fish oil-omega-3 fatty acids 1000 MG capsule 6/24/2023 at AM  Yes Yes   Sig: Take 1 capsule by mouth daily   folic acid (FOLVITE) 400 MCG tablet 6/24/2023 at AM  Yes Yes   Sig: Take 400 mcg by mouth daily   furosemide (LASIX) 20 MG tablet 6/24/2023 at AM  Yes Yes   Sig: Take  20 mg by mouth daily   lurbinectedin (ZEPZELCA) 4 MG injection 6/22/2023  Yes Yes   Sig: Inject into the vein once   methocarbamol (ROBAXIN) 750 MG tablet 6/24/2023 at x3  Yes Yes   Sig: Take 750 mg by mouth 3 times daily as needed for muscle spasms   methylphenidate (RITALIN) 5 MG tablet 6/24/2023 at AM  Yes Yes   Sig: Take 5 mg by mouth every morning   metoprolol tartrate (LOPRESSOR) 50 MG tablet 6/24/2023 at x2  Yes Yes   Sig: Take 50 mg by mouth 2 times daily   omeprazole (PRILOSEC) 20 MG DR capsule 6/24/2023 at AM  Yes Yes   Sig: Take 20 mg by mouth daily   ondansetron (ZOFRAN) 8 MG tablet 6/24/2023 at AM  Yes Yes   Sig: Take 2 tablets (16mg) by mouth every day for 2 days starting the day after chemotherapy to prevent nausea and vomiting.   pegfilgrastim-bmez (ZIEXTENZO) 6 MG/0.6ML injection 6/24/2023  Yes Yes   Sig: Inject 6 mg Subcutaneous once   pregabalin (LYRICA) 150 MG capsule 6/24/2023 at AM  Yes Yes   Sig: Take 150 mg by mouth daily   prochlorperazine (COMPAZINE) 10 MG tablet Unknown  Yes Yes   Sig: Take 10 mg by mouth every 6 hours as needed for nausea or vomiting   tamsulosin (FLOMAX) 0.4 MG capsule 6/24/2023 at PM  Yes Yes   Sig: Take 0.4 mg by mouth every evening   vitamin B-12 (CYANOCOBALAMIN) 100 MCG tablet 6/24/2023 at AM  Yes Yes   Sig: Take 1,000 mcg by mouth daily   vitamin C (ASCORBIC ACID) 500 MG tablet 6/24/2023 at AM  Yes Yes   Sig: Take 500 mg by mouth daily   vitamin D3 (CHOLECALCIFEROL) 50 mcg (2000 units) tablet 6/24/2023 at AM  Yes Yes   Sig: Take 1 tablet by mouth daily   zinc gluconate 50 MG tablet 6/24/2023 at AM  Yes Yes   Sig: Take 50 mg by mouth daily      Facility-Administered Medications: None     Allergies   Allergies   Allergen Reactions     Diltiazem Rash     Other reaction(s): Blistering rash  Skin peeling  Other reaction(s): Blistering rash  Skin peeling       Eliquis [Apixaban] Other (See Comments)     Nose bleeds and rash     Doxycycline Unknown and Rash     Rapid heart  beat ????  Rapid heart beat ????       Tetracycline Rash       Immunization History   Immunization History   Administered Date(s) Administered     COVID-19 Bivalent 12+ (Pfizer) 02/07/2023       Social History   I have reviewed this patient's social history and updated it with pertinent information if needed. Mulugeta Castillo  reports that he has quit smoking. He has never used smokeless tobacco. He reports current alcohol use. He reports that he does not use drugs.    Family History   I have reviewed this patient's family history and updated it with pertinent information if needed.   No family history on file.    Review of Systems   The 10 point Review of Systems is negative fairly dramatic describes shaking chills that are likely rigors    Physical Exam   Temp: 97.5  F (36.4  C) Temp src: Oral BP: 111/47 Pulse: 68   Resp: 18 SpO2: 100 % O2 Device: Oxymizer cannula Oxygen Delivery: 4 LPM  Vital Signs with Ranges  Temp:  [97.5  F (36.4  C)-98.6  F (37  C)] 97.5  F (36.4  C)  Pulse:  [68-82] 68  Resp:  [16-18] 18  BP: (111-134)/(34-49) 111/47  SpO2:  [86 %-100 %] 100 %  214 lbs 8.12 oz  Body mass index is 29.92 kg/m .    GENERAL APPEARANCE:  Awake mild confusion but reasonable historian  EYES: Eyes grossly normal to inspection  NECK: no adenopathy  RESP: lungs clear   CV: regular rates and rhythm  LYMPHATICS: normal ant/post cervical and supraclavicular nodes  ABDOMEN: soft, nontender  MS: extremities normal  SKIN: no suspicious lesions or rashes port site looks okay        Data   All laboratory and imaging data in the past 24 hours reviewed  No results for input(s): CULT in the last 168 hours.  No lab results found.    Invalid input(s): UC       All cultures:  No results for input(s): CULTURE in the last 168 hours.   Blood culture:  No results found for this or any previous visit.   Urine culture:  No results found for this or any previous visit.

## 2023-06-26 NOTE — PHARMACY-ADMISSION MEDICATION HISTORY
Pharmacist Admission Medication History    Admission medication history is complete. The information provided in this note is only as accurate as the sources available at the time of the update.    Medication reconciliation/reorder completed by provider prior to medication history? Yes    Information Source(s): Patient's spouse, Pat via phone call.  [facility] records.    Pertinent Information: Patient recently switched from oral etoposide to lurbinectedin intravenous -- first dose was on 06/22/23, dose given unknown.    Changes made to PTA medication list:    Added: Buprenorphine patch, Furosemide, Lurbinectedin, Methylphenidate, Ondansetron, Pegfilgrastim-bmez, Vit B    Deleted: Benzonatate, Calcium-Vit D, Dexamethasone, Etoposide, Guaifenesin-codeine, Ipratropium inhaler, Ketotifen ophthalmic, Lidocaine patch, Morphine CR, Oxycodone IR, Trazodone    Changed: Pregabalin 150mg AM & 300mg PM --> 150mg AM    Medication Affordability:  Not including over the counter (OTC) medications, was there a time in the past 3 months when you did not take your medications as prescribed because of cost?: No    Allergies reviewed with patient and updates made in EHR: yes    Medication History Completed By: Juan Valero [facility] 6/26/2023 9:31 AM    Prior to Admission medications    Medication Sig Last Dose Taking? Auth Provider Long Term End Date   acetaminophen (TYLENOL) 500 MG tablet Take 1,000 mg by mouth every 6 hours as needed 6/24/2023 at x3 Yes Reported, Patient     albuterol (PROAIR HFA/PROVENTIL HFA/VENTOLIN HFA) 108 (90 Base) MCG/ACT inhaler Inhale 2 puffs into the lungs Unknown Yes Reported, Patient Yes    amiodarone (PACERONE) 200 MG tablet Take 200 mg by mouth daily 6/24/2023 at AM Yes Reported, Patient Yes    aspirin 81 MG EC tablet Take 81 mg by mouth daily 6/24/2023 at AM Yes Unknown, Entered By History     buprenorphine (BUTRANS) 5 MCG/HR WK patch Place 1 patch onto the skin every 7 days on Fridays 6/23/2023 at  L torso per pt Yes Unknown, Entered By History     carboxymethylcellulose PF (REFRESH PLUS) 0.5 % ophthalmic solution Place 1 drop into both eyes 4 times daily as needed for dry eyes 6/24/2023 Yes Reported, Patient     celecoxib (CELEBREX) 100 MG capsule Take 100 mg by mouth 2 times daily as needed for pain 6/24/2023 at x2 Yes Reported, Patient Yes    diclofenac (VOLTAREN) 1 % topical gel Apply 4 g topically 4 times daily as needed for moderate pain to left side/back 6/24/2023 at x1 Yes Reported, Patient     finasteride (PROSCAR) 5 MG tablet Take 5 mg by mouth daily 6/24/2023 at AM Yes Reported, Patient     fish oil-omega-3 fatty acids 1000 MG capsule Take 1 capsule by mouth daily 6/24/2023 at AM Yes Reported, Patient     folic acid (FOLVITE) 400 MCG tablet Take 400 mcg by mouth daily 6/24/2023 at AM Yes Reported, Patient     furosemide (LASIX) 20 MG tablet Take 20 mg by mouth daily 6/24/2023 at AM Yes Unknown, Entered By History Yes    Garlic Oil 1000 MG CAPS Take 1 capsule by mouth daily 6/24/2023 at AM Yes Reported, Patient     lurbinectedin (ZEPZELCA) 4 MG injection Inject into the vein once 6/22/2023 Yes Unknown, Entered By History     Magnesium Oxide 420 MG TABS Take 1 tablet by mouth 3 times daily 6/24/2023 at x3 Yes Unknown, Entered By History     methocarbamol (ROBAXIN) 750 MG tablet Take 750 mg by mouth 3 times daily as needed for muscle spasms 6/24/2023 at x3 Yes Reported, Patient     methylphenidate (RITALIN) 5 MG tablet Take 5 mg by mouth every morning 6/24/2023 at AM Yes Unknown, Entered By History     metoprolol tartrate (LOPRESSOR) 50 MG tablet Take 50 mg by mouth 2 times daily 6/24/2023 at x2 Yes Unknown, Entered By History No    omeprazole (PRILOSEC) 20 MG DR capsule Take 20 mg by mouth daily 6/24/2023 at AM Yes Reported, Patient     ondansetron (ZOFRAN) 8 MG tablet Take 2 tablets (16mg) by mouth every day for 2 days starting the day after chemotherapy to prevent nausea and vomiting. 6/24/2023 at  AM Yes Unknown, Entered By History     pegfilgrastim-bmez (ZIEXTENZO) 6 MG/0.6ML injection Inject 6 mg Subcutaneous once 6/24/2023 Yes Unknown, Entered By History     pregabalin (LYRICA) 150 MG capsule Take 150 mg by mouth daily 6/24/2023 at AM Yes Reported, Patient Yes    prochlorperazine (COMPAZINE) 10 MG tablet Take 10 mg by mouth every 6 hours as needed for nausea or vomiting Unknown Yes Reported, Patient     tamsulosin (FLOMAX) 0.4 MG capsule Take 0.4 mg by mouth every evening 6/24/2023 at PM Yes Reported, Patient     vitamin B-12 (CYANOCOBALAMIN) 100 MCG tablet Take 1,000 mcg by mouth daily 6/24/2023 at AM Yes Unknown, Entered By History     vitamin C (ASCORBIC ACID) 500 MG tablet Take 500 mg by mouth daily 6/24/2023 at AM Yes Reported, Patient     vitamin D3 (CHOLECALCIFEROL) 50 mcg (2000 units) tablet Take 1 tablet by mouth daily 6/24/2023 at AM Yes Reported, Patient     zinc gluconate 50 MG tablet Take 50 mg by mouth daily 6/24/2023 at AM Yes Reported, Patient

## 2023-06-26 NOTE — PLAN OF CARE
Goal Outcome Evaluation:      Plan of Care Reviewed With: patient    Overall Patient Progress: improvingOverall Patient Progress: improving    Temp: 98  F (36.7  C) Temp src: Oral BP: (!) 134/34 Pulse: 82   Resp: 18 SpO2: 99 % O2 Device: Oxymizer cannula Oxygen Delivery: 4 LPM      Pt is Aox3, disoriented to time. A2 w/lift and on wheelchair. Currently on 4L O2 Oxyimzer. C/o pain. Constant confusion. Critical lab value: WBC 84.4 and Procalcitonin 7.95. Tele: SR. K+ 5.2

## 2023-06-26 NOTE — H&P
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 Neupogen) was seen in an outside ED for confusion and shakes.  Patient was reported hypoxic in 70s initially, work-up in the outside ED showed significant leukocytosis of 74, acute renal failure with creatinine of 1.8, initial potassium of 6, with elevated D-dimer.   CT head was negative, CT chest for PE was negative for pulmonary embolism but did show lung mass/consolidation.  Patient was transferred as a direct admission to [facility] for further evaluation and treatment.    Leukocytosis 84.4  ?  Received Neupogen  Masslike consolidation in setting of lung cancer  Immunosuppressed  Acute respiratory failure with hypoxia requiring 2 L of supplemental O2  Pneumonia cannot exclude post obstructive pneumonia  -Blood culture sent in ED  -Started on cefepime and vancomycin in ED will conntinue  -I will check procalcitonin (pt not febrile)  -Supplemental O2  -ID consult  -Check peripheral smear to rule out noninfectious causes for significant leukocytosis  -[facility]  Consider oncology consult while admitted here    Shakes/involuntary movement  -Rigors versus seizure  -I did not witness any involuntary movement  -We will monitor patient  -Empirically will place him on seizure precautions patient does have history of brain mets  -Symptoms could be related to infection and could have been rigors      Acute renal failure  (Baseline Creatinine 0.7)  -Creatinine in ED was upto 1.8  -Received fluids  -Some  improvement in renal functions  -We will check FENa  -IV fluids  -Avoid nephrotoxins  -Monitor electrolytes    Mild hyperkalemia  -Sample seemed hemolyzed  -Repeat potassium  -If potassium is high we will treat    Acute toxic/metabolic encephalopathy  -Seems to be clearing after receiving fluids and antibiotics  -Continue treatment with antibiotics  -Patient also is wearing a patch of Suboxone  -IV fluids    Chronic pain  -Patch noted for buprenorphine ?  Needs reconciliation  -On med listed patient was also on MS Contin  -Need to review and reorder meds once reconciled    History of atrial fibrillation  -Patient on amiodarone for rate control  -Anticoagulation was discontinued after patient had nosebleeds/?  Bleeding    Ascending aortic aneurysm  -4.9 cm  -Monitor    CAD  Hypertension  Hyperlipidemia  -Continue aspirin, metoprolol and statin    GERD  -On omeprazole        DVT Prophylaxis: Pneumatic Compression Devices  Code Status: Full Code : Patient was sleepy we will need to reaffirm CODE STATUS with family in a.m.    Disposition: Admitted as inpatient    We will need to review reorder meds once reconciled    Clinically Significant Risk Factors Present on Admission          # Hyperkalemia: Highest K = 5.4 mmol/L in last 2 days, will monitor as appropriate         # Drug Induced Platelet Defect: home medication list includes an antiplatelet medication                   Primary Care Physician   [facility]    Chief Complaint   Direct admission.  Patient was evaluated in outside ED for shakes and confusion    History is obtained from the patient, ED records, care everywhere    History of Present Illness   Mulugeta Castillo is a 82 year old male with past medical history significant for coronary disease, hypertension, hyperlipidemia, GERD, paroxysmal atrial fibrillation, bladder cancer, lung cancer metastatic to brain on chemotherapy(? Neupogen) was seen in an outside ED for confusion and shakes.   Patient was reported hypoxic in 70s initially, work-up in the outside ED showed significant leukocytosis of 74, acute renal failure with creatinine of 1.8, initial potassium of 6, with elevated D-dimer.   CT head was negative, CT chest for PE was negative for pulmonary embolism but did show lung mass/consolidation.  Patient was transferred as a direct admission to [facility] for further evaluation and treatment.    Patient was oriented x3 when I evaluated him.  He was not sure why he was brought to the emergency room.  He did complain of generalized weakness malaise unable to walk or do things.  Per ED records patient was confused and had shakes.  ED provider was concerned about infection versus malignancy.  In ED was empirically treated with IV antibiotics and was transferred for further evaluation and treatment.  [facility]  No beds were available and so was admitted here.    He denies any chest pain pressure heaviness at this time.  Denies any abdominal pain.  No dysuria no hematuria.  Patient complains of chronic chest wall pain.    More than 10 point review of systems was carried out was otherwise negative.      Past Medical History    Past Medical History:   Diagnosis Date     Lung cancer (H)      Metastasis to brain (H)    Past Medical History:   . 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 Laterality Date   CATARACT EXTRACTION, BILATERAL Bilateral 2022   HERNIA REPAIR 01/13/1989   Left Inguinal Hernia, Indirect   HERNIA REPAIR Left   2nd time   LUMBAR DISKECTOMY 01/09/1991   L4-5 Lumbar Microdiskectomy   LUMBAR FUSION 2014   OTHER 08/30/2004   Right Hydrocelectomy   OTHER 07/07/1989   Left Spermatocelectomy   TRANSURETHRAL RESECTION OF BLADDER TUMOR 10/08/2020   TRANSURETHRAL RESECTION OF BLADDER TUMOR 11/19/2020   restage, right RPG          Past Surgical History   Past Surgical History:   Procedure Laterality Date     ENDOBRONCHIAL ULTRASOUND FLEXIBLE N/A 3/31/2021    Procedure: Flexible Bronchoscopy, Airway Exam, Therapeutic Suctioning, Esophagogastroduodenoscopy;  Surgeon: Hema Barajas MD;  Location: [facility] OR       Prior to Admission Medications   Prior to Admission Medications   Prescriptions Last Dose Informant Patient Reported? Taking?   Aspirin Buf,CaCarb-MgCarb-MgO, 81 MG TABS   Yes No   Sig: Take 81 mg by mouth   Calcium Carb-Cholecalciferol 500-400 MG-UNT/5ML LIQD   Yes No   Sig: Take 500 mg by mouth   Garlic Oil 1000 MG CAPS   Yes No   Magnesium 500 MG CAPS   Yes No   Sig: Take 500 mg by mouth   Magnesium Oxide 420 MG TABS   Yes No   Sig: Taking one pill per day with other OTC MG   Omega-3 Fatty Acids (FISH OIL) 500 MG CAPS   Yes No   acetaminophen (TYLENOL) 500 MG tablet   Yes No   Sig: Take 500-1,000 mg by mouth every 6 hours as needed for mild pain Take one tablet every 6 hours for pain. Not to exceed 4000mg in 24 hours.   albuterol (PROAIR HFA/PROVENTIL HFA/VENTOLIN HFA) 108 (90 Base) MCG/ACT inhaler   Yes No   Sig: Inhale 2 puffs into the lungs   amiodarone (PACERONE) 200 MG tablet   Yes No   Sig: Take 200 mg by mouth daily   benzonatate (TESSALON) 100 MG capsule   Yes No   Sig: Take 100 mg by mouth 3 times daily as needed for cough   carboxymethylcellulose  PF (REFRESH PLUS) 0.5 % ophthalmic solution   Yes No   Si drop 3 times daily as needed for dry eyes Instill 1 drop in both eyes four times a day for dry eyes.   celecoxib (CELEBREX) 100 MG capsule   Yes No   Sig: Take 100 mg by mouth 2 times daily As needed for pain   diclofenac (VOLTAREN) 1 % topical gel   Yes No   Sig: Apply topically 4 times daily As needed   finasteride (PROSCAR) 5 MG tablet   Yes No   Sig: Take 5 mg by mouth daily   folic acid (FOLVITE) 400 MCG tablet   Yes No   Sig: Take 400 mcg by mouth   guaiFENesin-codeine (ROBITUSSIN AC) 100-10 MG/5ML solution   Yes No   Sig: Take by mouth every 4 hours as needed for cough 5 ml every evening as needed for nighttime cough and pain   Patient not taking: Reported on 1/10/2022   ipratropium (ATROVENT HFA) 17 MCG/ACT inhaler   Yes No   Sig: INHALE 2 PUFFS BY INHALATION EVERY 6 HOURS FOR BREATHING   ketotifen (ZADITOR) 0.025 % ophthalmic solution   Yes No   Si drop 2 times daily As needed for allergies   lidocaine (LIDODERM) 5 % patch   Yes No   Sig: APPLY 1 PATCH TO CHEST WALL EVERY DAY FOR PAIN RELIEF -WEAR FOR ONLY 12 HOURS THEN REMOVE AND LEAVE OFF FOR 12 HOURS   loperamide (IMODIUM) 2 MG capsule   Yes No   Sig: Take 2 mg by mouth Take two capsules at onset of diarrhea then one capsule every two hours until no diarrhea for 12 hours.   methocarbamol (ROBAXIN) 750 MG tablet   Yes No   Sig: Take 750 mg by mouth 3 times daily As needed for muscle spasms   metoprolol tartrate (LOPRESSOR) 50 MG tablet   Yes No   Sig: TAKE ONE AND ONE-HALF TABLETS BY MOUTH TWICE A DAY   morphine (MS CONTIN) 15 MG CR tablet   Yes No   Sig: TAKE TWO TABLETS BY MOUTH EVERY MORNING AND TAKE ONE TABLET AT BEDTIME FOR PAIN **NO REFILLS**   naloxone (NARCAN) 4 MG/0.1ML nasal spray   Yes No   Si mg once as needed for opioid reversal Spray 1 dose in one nostril once as needed for unresponsiveness then call 911- if no change in 2-3 minutes give second dose in opposite nostril    Patient not taking: Reported on 1/10/2022   omeprazole (PRILOSEC) 20 MG DR capsule   Yes No   Sig: TAKE ONE CAPSULE BY MOUTH EVERY DAY ON AN EMPTY STOMACH, AT LEAST 30 MINUTES PRIOR TO A MEAL FOR STOMACH ACID   oxyCODONE IR (ROXICODONE) 10 MG tablet   Yes No   Sig: TAKE 1 TABLETS BY MOUTH EVERY 6 HOURS FOR 7 DAYS   pregabalin (LYRICA) 150 MG capsule   Yes No   Sig: Take 150 mg by mouth One capsule every morning and two capsules at bedtime for pain   prochlorperazine (COMPAZINE) 10 MG tablet   Yes No   Sig: Take 10 mg by mouth every 6 hours as needed for nausea or vomiting   senna (SENOKOT) 8.6 MG tablet   Yes No   Sig: Take 17.2 mg by mouth   tamsulosin (FLOMAX) 0.4 MG capsule   Yes No   Sig: TAKE ONE CAPSULE BY MOUTH EVERY DAY FOR BLADDER EMPTYING   traZODone (DESYREL) 50 MG tablet   Yes No   Sig: Take 1 tablet by mouth   Patient not taking: Reported on 1/10/2022   vitamin C (ASCORBIC ACID) 500 MG tablet   Yes No   Sig: Take 500 mg by mouth daily   vitamin D3 (CHOLECALCIFEROL) 50 mcg (2000 units) tablet   Yes No   Sig: Take 1 tablet by mouth daily   zinc gluconate 50 MG tablet   Yes No   Sig: Take 50 mg by mouth      Facility-Administered Medications: None     Allergies   Allergies   Allergen Reactions     Diltiazem Rash     Other reaction(s): Blistering rash  Skin peeling  Other reaction(s): Blistering rash  Skin peeling       Eliquis [Apixaban]      Nose bleeds and rash     Doxycycline Unknown and Rash     Rapid heart beat ????  Rapid heart beat ????       Tetracycline Rash       Social History   Social History     Tobacco Use     Smoking status: Former     Smokeless tobacco: Never   Substance Use Topics     Alcohol use: Yes     Comment: Occasional     Social History     Social History Narrative     Not on file         Family History   I have reviewed this patient's family history and updated it with pertinent information if needed.   No family history on file.      Review of Systems   A Comprehensive greater  than 10 system review of systems was carried out.  Pertinent positives and negatives are noted above.  Otherwise negative for contributory information.    Physical Exam   Temp: 98.5  F (36.9  C) Temp src: Oral BP: 134/49 Pulse: 78   Resp: 16   O2 Device: Nasal cannula Oxygen Delivery: 4 LPM  Vital Signs with Ranges  Temp:  [98.5  F (36.9  C)] 98.5  F (36.9  C)  Pulse:  [78] 78  Resp:  [16] 16  BP: (134)/(49) 134/49  0 lbs 0 oz    GEN:  Alert, oriented x 3, appears comfortable, no overt distress  HEENT:  Normocephalic/atraumatic, no scleral icterus, no nasal discharge, mouth dry  CV: Irregularly irregular, systolic murmur+ S1 + S2 noted, no S3 or S4.  LUNGS: Poor inspiratory effort bibasilar crackles.  Symmetric chest rise on inhalation noted.  ABD:  Active bowel sounds, soft, non-tender/non-distended.  No rebound/guarding/rigidity.  EXT:  + edema.  No cyanosis.  No joint synovitis noted.  SKIN:  Dry to touch, no exanthems noted in the visualized areas.  Noted Suboxone med patch  NEURO: Sleepy but oriented x3.  Moving all extremities.  No focal  abnormalities    Data   Data reviewed today:  I personally reviewed imaging done at outside hospital reviewed.      Following labs for Care everywhere   Labs:   Results for orders placed or performed during the hospital encounter of 06/25/23   LACTATE VENOUS   Result Value Ref Range Status   LACTATE,VENOUS 1.1 0.5 - 2.0 mmol/L Final   PROTIME-INR   Result Value Ref Range Status   INR 1.0 <1.3 Final   PROTIME 13.1 12.0 - 13.8 sec Final   COMP METABOLIC PANEL   Result Value Ref Range Status   SODIUM 137 135 - 145 mmol/L Final   POTASSIUM 6.5 (HH) 3.5 - 5.0 mmol/L Final   CHLORIDE 103 98 - 110 mmol/L Final   CO2,TOTAL 27 21 - 31 mmol/L Final   ANION GAP 7 5 - 18 Final   GLUCOSE 99 70 - 99 mg/dL Final   CALCIUM 9.5 8.5 - 10.5 mg/dL Final   BUN 36 (H) 8 - 25 mg/dL Final   CREATININE 1.81 (H) 0.72 - 1.25 mg/dL Final   BUN/CREAT RATIO 20 10 - 20 Final   ALBUMIN 3.9 3.2 - 4.6 g/dL  Final   PROTEIN,TOTAL 6.4 6.0 - 8.0 g/dL Final   BILIRUBIN,TOTAL 0.4 0.2 - 1.2 mg/dL Final   ALK PHOSPHATASE 132 50 - 136 IU/L Final   ALT (SGPT) 12 8 - 45 IU/L Final   AST (SGOT) 25 2 - 40 IU/L Final   eGFR 37 (L) >90 mL/min/1.73m2 Final   BLOOD CULTURE X2   Specimen: Blood   Result Value Ref Range Status   CULTURE No growth to date. Preliminary   BLOOD CULTURE X2   Specimen: Blood   Result Value Ref Range Status   CULTURE No growth to date. Preliminary   COVID-19 MOLECULAR   Specimen: Nasopharyngeal; Other   Result Value Ref Range Status   COVID 19 [facility] MOLECULAR Negative Negative Final   TESTING LABORATORY [facility] Laboratory Final   PROCALCITONIN   Result Value Ref Range Status   PROCALCITONIN 14.37 (H) <0.50 ng/ml Final   URINALYSIS W REFLEX MICROSCOPIC IF POSITIVE   Result Value Ref Range Status   COLOR Yellow Yellow Color Final   CLARITY Clear Clear Clarity Final   SPECIFIC GRAVITY,URINE 1.025 1.010, 1.015, 1.020, 1.025 Final   PH,URINE 5.5 6.0, 7.0, 8.0, 5.5, 6.5, 7.5, 8.5 Final   UROBILINOGEN,QUALITATIVE Normal Normal EU/dl Final   PROTEIN, URINE Trace (A) Negative mg/dL Final   GLUCOSE, URINE Negative Negative mg/dL Final   KETONES,URINE Negative Negative mg/dL Final   BILIRUBIN,URINE Negative Negative Final   OCCULT BLOOD,URINE Negative Negative Final   NITRITE Negative Negative Final   LEUKOCYTE ESTERASE Negative Negative Final   INFLUENZA A/B/H1N1 PCR   Specimen: Nasopharyngeal; Other   Result Value Ref Range Status   INFLUENZA A PCR Negative Final   INFLUENZA B PCR Negative Final   TROPONIN I   Result Value Ref Range Status   TROPONIN I 0.015 <0.034 ng/mL Final   APTT   Result Value Ref Range Status   APTT 25 24 - 33 sec Final   D-DIMER,QUANTITATIVE   Result Value Ref Range Status   D-DIMER,QUANTITATIVE 3.03 See Comment / FEU mcg/mL Final   D-DIMER INTERP Abnormal (A) Final   BLOOD GAS,VENOUS   Result Value Ref Range Status   PH, VENOUS 7.35 7.32 - 7.43 Final   PCO2, VENOUS 52 (H) 41 - 51 mmHg  Final   PO2, VENOUS 104 (H) 35 - 40 mmHg Final   HCO3,VENOUS 29 22 - 29 mmol/L Final   BASE EXCESS, VENOUS, POCT 2.1 -2.0 - 3.0 Final   O2 SATURATION, VENOUS 99 (H) 70 - 75 % Final   PATIENT TEMPERATURE 37.0 Degrees C Final   BRAIN NATRIURETIC PEPTIDE   Result Value Ref Range Status   BRAIN ANDREI PEPTIDE 614 (H) <100 pg/mL Final   CBC WITH AUTO DIFFERENTIAL   Result Value Ref Range Status   WHITE BLOOD COUNT 71.4 (HH) 4.5 - 11.0 thou/cu mm Final   RED BLOOD COUNT 2.34 (L) 4.30 - 5.90 mil/cu mm Final   HEMOGLOBIN 8.5 (L) 13.5 - 17.5 g/dL Final   HEMATOCRIT 27.7 (L) 37.0 - 53.0 % Final    (H) 80 - 100 fL Final   MCH 36.3 (H) 26.0 - 34.0 pg Final   MCHC 30.7 (L) 32.0 - 36.0 g/dL Final   RDW 16.8 (H) 11.5 - 15.5 % Final   PLATELET COUNT 175 140 - 440 thou/cu mm Final   MPV 10.2 6.5 - 11.0 fL Final   NRBC 0.0 % Final   ABS NRBC 0.0 thou /cu mm Final   MANUAL DIFFERENTIAL   Result Value Ref Range Status   % NEUTROPHILS 94.0 % Final   % LYMPHOCYTES 3.0 % Final   % MONOCYTES 3.0 % Final   % EOSINOPHILS 0.0 % Final   % BASOPHILS 0.0 % Final   NEUTROPHILS ABSOLUTE 67.1 (H) 1.7 - 7.0 thou/cu mm Final   LYMPHOCYTES ABSOLUTE 2.1 0.9 - 2.9 thou/cu mm Final   MONOCYTES ABSOLUTE 2.1 (H) <0.9 thou/cu mm Final   EOSINOPHILS ABSOLUTE 0.0 <0.5 thou/cu mm Final   BASOPHILS ABSOLUTE 0.0 <0.3 thou/cu mm Final   PLATELET ESTIMATE   Result Value Ref Range Status   PLATELET ESTIMATE Adequate Adequate, No estimate Final   RED CELL MORPHOLOGY   Result Value Ref Range Status   ELLIPTOCYTES Few Final   SCHISTOCYTES Few Final   RBC COMMENT Present (A) RBC morphology appears normal, RBC morphology within normal limits for newborns. Final   Potassium   Result Value Ref Range Status   POTASSIUM 4.9 3.5 - 5.0 mmol/L Final   Potassium   Result Value Ref Range Status   POTASSIUM 5.1 (H) 3.5 - 5.0 mmol/L Final   GLUCOSE METER   Result Value Ref Range Status   GLUCOSE METER 86 65 - 100 mg/dL Final   GLUCOSE METER   Result Value Ref Range Status    GLUCOSE METER 96 65 - 100 mg/dL Final   GLUCOSE METER   Result Value Ref Range Status   GLUCOSE METER 90 65 - 100 mg/dL Final   GLUCOSE METER   Result Value Ref Range Status   GLUCOSE METER 112 (H) 65 - 100 mg/dL Final   GLUCOSE METER   Result Value Ref Range Status   GLUCOSE METER 108 (H) 65 - 100 mg/dL Final   GLUCOSE METER   Result Value Ref Range Status   GLUCOSE METER 97 65 - 100 mg/dL Final   EKG 12 Lead   Result Value Ref Range Status   Interpretation Preliminary   Sinus rhythm with Premature supraventricular complexes  Incomplete right bundle branch block  Inferior infarct , age undetermined  Anterior infarct , age undetermined  Abnormal ECG      Recent Results (from the past 24 hour(s))   CT HEAD BRAIN WO    Narrative    For Patients:  As a result of the 21st Century Cures Act, medical imaging exams and procedure reports are released immediately into your electronic medical record.  You may view this report before your referring provider.  If you have questions, please contact your health care provider.      INDICATION:  Confusion.    TECHNIQUE:  Noncontrast CT images acquired through the brain.    COMPARISON:  MRI brain 01/01/2020.    FINDINGS:  Mild diffuse cerebral volume loss. No mass effect or midline shift. The gray-white differentiation is maintained.  No acute intracranial hemorrhage or pathologic extra-axial fluid collection. Scattered hypoattenuation in the supratentorial white matter, suggestive of mild chronic microvascular ischemic changes. Intracranial atherosclerotic calcifications.  Thinning of the ocular lenses. The calvarium is intact. The visualized paranasal sinuses are well aerated. The mastoid air cells are clear.    Impression    No acute intracranial hemorrhage or mass effect.      Please note that all CT scans at this facility use dose modulation, iterative reconstruction, and/or weight-based dosing when appropriate to reduce radiation dose to as low as reasonably  achievable.    Dictated by Yonis Almonte MD @ 6/25/2023 3:31:44 PM    (Electronically Signed)   XR CHEST 1 VIEW PORTABLE    Narrative    For Patients:  As a result of the 21st Century Cures Act, medical imaging exams and procedure reports are released immediately into your electronic medical record.  You may view this report before your referring provider.  If you have questions, please contact your health care provider.      INDICATION:   Sepsis     TECHNIQUE:  Single view chest.    FINDINGS:  Enlarged cardiac silhouette. Right Port-A-Cath in the right atrium. Low lung volumes. Basilar atelectasis. No effusion or pneumothorax.        Dictated by Otilia Jacques MD @ 6/25/2023 3:44:44 PM    (Electronically Signed)   US VENOUS LOWER EXTREMITY BILATERAL    Narrative    For Patients:  As a result of the 21st Century Cures Act, medical imaging exams and procedure reports are released immediately into your electronic medical record.  You may view this report before your referring provider.  If you have questions, please contact your health care provider.      INDICATION:  Bilateral pain and swelling.    COMPARISON:  Left lower extremity venous ultrasound done 01/13/2023.   Findings:   Bilateral lower extremity venous ultrasound was performed using grayscale imaging with color Doppler and spectral analysis. The bilateral common femoral veins, superficial femoral veins and popliteal veins are all fully compressible with spontaneous venous flow and augmentation. The bilateral peroneal veins, posterior tibial veins, greater saphenous veins and profunda veins are also patent.   There are Baker cysts in the popliteal fossa bilaterally. In the right leg this measures 3.0 x 4.4 x 1.5 cm. In the left leg is measures 4.5 x 3.8 x 1.0 cm.    Impression    No evidence of deep venous thrombosis in the bilateral lower extremities.  Baker cysts in the popliteal fossa bilaterally.        Dictated by Ha Rubalcava MD @ 6/25/2023 6:57:18  PM    (Electronically Signed)   CT CHEST PE STUDY    Narrative    For Patients:  As a result of the 21st Century Cures Act, medical imaging exams and procedure reports are released immediately into your electronic medical record.  You may view this report before your referring provider.  If you have questions, please contact your health care provider.      INDICATION:  Hypoxia, elevated D-dimer.     TECHNIQUE:  CT chest PE was acquired with 93 cc Omnipaque 350 IV contrast. Permanently recorded images are archived.    COMPARISON:  Chest CT 06/25/2010.    FINDINGS:  Heart and vasculature: Contrast opacification of the pulmonary arterial tree is adequate. No sign of pulmonary embolism. Cardiomegaly with right atrial and right ventricular enlargement. Dilated main pulmonary artery measuring 3.9 cm. Four point 9 cm ascending aortic aneurysm. Atherosclerotic calcification of the thoracic aorta. No pericardial effusion.     Lungs and pleura: Trace, chronic-appearing right pleural effusion with very mild right lower lobe compressive atelectasis. Likely trace left pleural effusion with very mild passive atelectasis as well. Band of scarring result like the cysts in the lateral right middle lobe. Foci of mucus in the trachea. See below for the left hilar/left upper lobe findings. No pneumothorax.     Lymph nodes/mediastinum: No mediastinal, hilar, or axillary adenopathy. Mass like consolidation/fullness of the left hilum extending into the left upper lobe toward the apical pleural surface. There is occlusion of some of the left upper lobe airways in this opacity. Extensive mediastinal fat.     Chest wall: Right chest port with the tip terminating at the superior cavoatrial junction.     Thyroid: Unremarkable.    Upper abdomen: Multiple hepatic cysts including a large cyst in the left hepatic lobe. Cholelithiasis. Diverticulosis.     Bones: Old bilateral rib fractures.     Impression    No pulmonary embolism.     Masslike  consolidation/fullness of the left hilum extending into the left upper lobe toward the apical pleural surface with occlusion of some of the left upper lobe airways. This likely represents sequela of old infection or treated malignancy. However, cannot entirely exclude metabolically active malignancy. No mediastinal lymphadenopathy is reassuring. Recommend correlation with medical history.     Cardiomegaly with right atrial and right ventricular enlargement with findings compatible with pulmonary arterial hypertension.     4.9 cm ascending aortic aneurysm.     Cholelithiasis.      Please note that all CT scans at this facility use dose modulation, iterative reconstruction, and/or weight-based dosing when appropriate to reduce radiation dose to as low as reasonably achievable.    Dictated by Mook Loera MD @ 6/25/2023 9:06:35 PM    (Electronically Signed)

## 2023-06-26 NOTE — PROVIDER NOTIFICATION
1722: Paged MD notifying that patients wife stated he was experiencing visual hallucinations earlier today and requested that the MD was aware. MD aware.

## 2023-06-27 NOTE — CONSULTS
Palliative Care Consultation Note  [facility]      Patient: Mulugeta Castillo  Date of Admission:  6/25/2023    Requesting Clinician / Team: Hospital medicine   Reason for consult:   Goals of care  Decisional support  Patient and family support     Recommendations & Counseling     GOALS OF CARE:     Life-prolonging with limits     Hoping for medical optimization and improvement. Hoping to return home and back to previous level of function     Change code status to DNR/DNI. Would want all efforts to improve health except if he would need artificial means to sustain life    ADVANCE CARE PLANNING:    Advance Directive has been requested.    There is no POLST form on file, plan to complete prior to DC.    Code status: No CPR- Do NOT Intubate    MEDICAL MANAGEMENT:   #Delirium  #Agitation    Avoid benzodiazepines, antihistamines, anticholinergics if able.    Lights on and blinds open during the day.  Reorient frequently.    Lights & TV off during the night.  Promote normal circadian rhythm.    Limit sensory deprivation - utilize hearing aids, glasses, etc.    Frequently assess basic needs such as temperature, elimination, thirst/hunger, pain    Consider bedside attendant (if able) for additional safety    Quetiapine (Seroquel) start at HS        PSYCHOSOCIAL/SPIRITUAL SUPPORT:    Family     Friends  [place] community: [employer]   Would appreciate Spiritual Health Services    Palliative Care will continue to follow. Thank you for the consult and allowing us to aid in the care of Mulugeta Castillo.    These recommendations have been discussed with the primary and bedside team.    Franco Da Silva NP  Securely message with [employer] (more info)  Text page via [facility] Paging/Directory       Palliative Summary/HPI     Mulugeta Castillo is a 82 year old male with past medical history significant for coronary disease, hypertension, hyperlipidemia, GERD, paroxysmal atrial fibrillation, bladder cancer, lung cancer metastatic to  brain Mulugeta Castillo is a 82 year old male with past medical history significant for coronary disease, hypertension, hyperlipidemia, GERD, paroxysmal atrial fibrillation, bladder cancer, lung cancer metastatic to brain, however most recent scan in May show no acute brain abnormalities       Today, the patient was seen for:  Role introduction, support     Palliative Care Summary:   Met with Mulugeta and his wife Leyda.   I introduced our role as an extra layer of support and how we help patients and families dealing with serious, potentially life-limiting illnesses. I explained the composition of the palliative care team.  Palliative care helps patients and families navigate their care while focusing on the whole person; providing emotional, social and spiritual support  Palliative care often assists with symptom management, information sharing about what to expect from the illness, available treatment options and what effect those options may have on the disease course, and provide effective communication and caring support.    Prognosis, Goals, & Planning:      Functional Status just prior to this current hospitalization:    There is not reported or documented decline in patient's function.      ECOG0 (Fully active, able to carry on all activities without restriction)      Prognosis, Goals, and/or Advance Care Planning:    We discussed general treatment options (full/restorative, selective/conservatives, and comfort only/hospice). We then discussed how these specifically apply to Mulugeta. Based on this discussion, Family is hoping for continued medical optimization and recovery.  They are hopeful that he will be able to return to his previous baseline of functioning.  Up until days prior he was fully ambulatory, driving but did have increasing fatigue requiring increased naps throughout the day.  While they are hoping for medical optimization and improvement we discussed that if things were to take a turn for the worse he  would not want to be supported with mechanical ventilation or life support.  He also would not want to have artificial feedings nor CPR. Pat stated that he  healthcare directive (copy requested)       Code Status was addressed today:     Yes, We discussed potential risks and rationale of attempting cardiac resuscitation, intubation, and mechanical ventilation.  We also discussed probability of survival as well as quality of life implications.  Based on this discussion, patient or surrogate response/decision: change to DNR/DNI        Patient's decision making preferences: shared with support from loved ones          Patient has decision-making capacity today for complex decisions:Unreliable            Coping, Meaning, & Spirituality:     Mood, coping, and/or meaning in the context of serious illness were addressed today: Yes    Social:   Living situation:lives with significant other/spouse  Important relationships/caregivers:Has 3 children, 2 sons and 1 daughter.  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  Areas of fulfillment/viki: Enjoys being social and being active.  Enjoys gardening and being outside.    Medications:  I have reviewed this patient's medication profile and medications from this hospitalization.     ROS:  Comprehensive ROS is reviewed and is negative except as here & per HPI:     Physical Exam   Vital Signs with Ranges  Temp:  [97.5  F (36.4  C)-97.9  F (36.6  C)] 97.8  F (36.6  C)  Pulse:  [68-91] 86  Resp:  [18-20] 20  BP: (109-139)/(39-78) 109/39  SpO2:  [54 %-100 %] 95 %  212 lbs 6.4 oz    Physical Exam  Vitals and nursing note reviewed.   HENT:      Head: Normocephalic.      Mouth/Throat:      Mouth: Mucous membranes are moist.      Pharynx: Oropharynx is clear.   Eyes:      Extraocular Movements: Extraocular movements intact.      Conjunctiva/sclera: Conjunctivae normal.      Pupils: Pupils are equal,  round, and reactive to light.   Cardiovascular:      Rate and Rhythm: Normal rate and regular rhythm.      Pulses: Normal pulses.   Pulmonary:      Effort: Pulmonary effort is normal. No respiratory distress.      Breath sounds: No wheezing.   Abdominal:      General: Abdomen is flat. There is no distension.      Tenderness: There is no abdominal tenderness.   Musculoskeletal:         General: Normal range of motion.   Skin:     General: Skin is warm and dry.      Capillary Refill: Capillary refill takes less than 2 seconds.   Neurological:      Mental Status: He is lethargic and disoriented.      GCS: GCS eye subscore is 3. GCS verbal subscore is 4.      Cranial Nerves: No cranial nerve deficit.   Psychiatric:         Mood and Affect: Mood normal.           Data reviewed:  Results for orders placed or performed during the hospital encounter of 06/25/23 (from the past 24 hour(s))   EKG 12-lead, tracing only   Result Value Ref Range    Systolic Blood Pressure  mmHg    Diastolic Blood Pressure  mmHg    Ventricular Rate 68 BPM    Atrial Rate 68 BPM    AK Interval 180 ms    QRS Duration 80 ms     ms    QTc 414 ms    P Axis 39 degrees    R AXIS -19 degrees    T Axis 48 degrees    Interpretation ECG       Sinus rhythm with Premature supraventricular complexes  Cannot rule out Inferior infarct , age undetermined  Anteroseptal infarct , age undetermined  Abnormal ECG     Basic metabolic panel   Result Value Ref Range    Sodium 137 136 - 145 mmol/L    Potassium 5.0 3.4 - 5.3 mmol/L    Chloride 101 98 - 107 mmol/L    Carbon Dioxide (CO2) 29 22 - 29 mmol/L    Anion Gap 7 7 - 15 mmol/L    Urea Nitrogen 23.3 (H) 8.0 - 23.0 mg/dL    Creatinine 1.17 0.67 - 1.17 mg/dL    Calcium 9.4 8.8 - 10.2 mg/dL    Glucose 144 (H) 70 - 99 mg/dL    GFR Estimate 62 >60 mL/min/1.73m2   MRSA MSSA PCR, Nasal Swab    Specimen: Nares, Bilateral; Swab   Result Value Ref Range    MRSA Target DNA Negative Negative    SA Target DNA Negative      Narrative    The [email or web address]  Xpert SA Nasal Complete assay performed in the [employer]  Dx System is a qualitative in vitro diagnostic test designed for rapid detection of Staphylococcus aureus (SA) and methicillin-resistant Staphylococcus aureus (MRSA) from nasal swabs in patients at risk for nasal colonization. The test utilizes automated real-time polymerase chain reaction (PCR) to detect MRSA/SA DNA. The Xpert SA Nasal Complete assay is intended to aid in the prevention and control of MRSA/SA infections in healthcare settings. The assay is not intended to diagnose, guide or monitor treatment for MRSA/SA infections, or provide results of susceptibility to methicillin. A negative result does not preclude MRSA/SA nasal colonization.    Basic metabolic panel   Result Value Ref Range    Sodium 140 136 - 145 mmol/L    Potassium 4.9 3.4 - 5.3 mmol/L    Chloride 103 98 - 107 mmol/L    Carbon Dioxide (CO2) 31 (H) 22 - 29 mmol/L    Anion Gap 6 (L) 7 - 15 mmol/L    Urea Nitrogen 19.2 8.0 - 23.0 mg/dL    Creatinine 0.95 0.67 - 1.17 mg/dL    Calcium 9.4 8.8 - 10.2 mg/dL    Glucose 145 (H) 70 - 99 mg/dL    GFR Estimate 79 >60 mL/min/1.73m2   CBC with platelets   Result Value Ref Range    WBC Count 58.1 (HH) 4.0 - 11.0 10e3/uL    RBC Count 2.20 (L) 4.40 - 5.90 10e6/uL    Hemoglobin 8.2 (L) 13.3 - 17.7 g/dL    Hematocrit 26.3 (L) 40.0 - 53.0 %     (H) 78 - 100 fL    MCH 37.3 (H) 26.5 - 33.0 pg    MCHC 31.2 (L) 31.5 - 36.5 g/dL    RDW 16.6 (H) 10.0 - 15.0 %    Platelet Count 159 150 - 450 10e3/uL   Extra Tube    Narrative    The following orders were created for panel order Extra Tube.  Procedure                               Abnormality         Status                     ---------                               -----------         ------                     Extra Red Top Tube[[id number]]                               Final result                 Please view results for these tests on the individual orders.   Extra Red Top Tube    Result Value Ref Range    Hold Specimen [facility]           Medical Decision Making     82 MINUTES SPENT BY ME on the date of service doing chart review, history, exam, documentation & further activities per the note.

## 2023-06-27 NOTE — PROGRESS NOTES
"[facility]    Medicine Progress Note - Hospitalist Service    Date of Admission:  6/25/2023    Assessment & Plan   Mulugeta Castillo is a 82 year old male came to attention on 6/25/2023 due to increased somnolence.      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  He has been on multiple chemotherapeutic agents and most recently started on Zepzelca just a couple of days ago.  I note that Ms. Castillo reports that the patient had been remarkably alert and active until Sunday, when he became confused and somnolent.     He initially presented to the [facility] emergency department where he was assessed and found to be hypoxic with a significantly white cell count, acute kidney injury and hyperkalemia.  He was treated for the hyperkalemia and started on broad-spectrum antibiotics after blood and urine cultures were obtained.  [facility], the [facility].    [facility] but nothing has come yet today.      I asked for the images from [facility] to be   Pushed\" to our system. THEY ARE VISIBLE, BUT YOU HAVE TO GO INTO PACS AND THEN YOU CAN SEE THE IMPORTED IMAGES FROM 6/25.            Diagnoses:  1.  Acute hypoxic respiratory failure.  Suspected post-obstructive pneumonia.  Procal elevated. CT Chest report equivocal.  2.  Encephalopathy.  Mr. Castillo was briefly awake and fully appropriate this am. However, he became very somnolent over the subsequent couple of hours.     -VBG shows Respiratory Acidosis, which I suspect is due to excessive oxygen flow.    -I had considered MRI brain, but when the pt wakened at around 10 am this morning, he looked so good that I discontinued it. He had a virtually normal MRI of his brain on 5/5/23.  (His wife brought the report today.)  3.  Acute renal failure with hyperkalemia.  " "The high K was managed while patient was at the [place] emergency department.  EDITH is common with the Zepzelka which the patient just started last Thursday.  4.  Chronic left-sided chest pain associated with left-sided lung cancer for which the patient is on buprenorphine and pregabablin.    5.  Atrial fibrillation for which patient is on amiodarone.  6.  EDITH with hyperkalemia.  Resolved.   7.  Marked leukocytosis.  Due to exposure to G-CSF which was just administered on Saturday.  Trending toward normal.   8.  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  Previous gamma knife surgery for brain metastases.  9.  Hypertension  10. Hx bladder cancer, not currently in treatment.       PLAN:  1.  Cefipime switched to Zosyn due to concern that the Cefipime could cause some increased confusion.   2.  I requested records from the patient's oncologist at [facility].  3.  Initiate Zyprexa at suppertime for hallucinations that have been present in the past couple of days.  4.  Patient appears to be euvolemic at this time.  FeNa is 1.3 suggesting that he is not prerenal.  We will continue with low volume maintenance therapy.  5.  Goal for Oxygen sat is > 88, but < 95.         Diet: Combination Diet Regular Diet Adult    DVT Prophylaxis: Pneumatic Compression Devices  Chiang Catheter: Not present  Lines: p  Port a Cath 06/26/23 Single Lumen Right Chest wall-Site Assessment: WDL      Cardiac Monitoring: ACTIVE order. Indication: Electrolyte Imbalance (24 hours)- Magnesium <1.3 mg/ml; Potassium < =2.8 or > 5.5 mg/ml  Code Status: Full Code      Clinically Significant Risk Factors        # Hyperkalemia: Highest K = 5.4 mmol/L in last 2 days, will monitor as appropriate                  # Overweight: Estimated body mass index is 29.62 kg/m  as calculated from the following:    Height as of this encounter: 1.803 m (5' 11\").    Weight as of this encounter: 96.3 kg (212 lb 6.4 oz)., PRESENT ON ADMISSION          Disposition Plan "     Expected Discharge Date: 06/28/2023                  Taran Saldana MD  Hospitalist Service  [facility]  Securely message with [employer] (more info)  Text page via [employer] Paging/Directory   ______________________________________________________________________    Interval History   Stable, but still confused.  Required haldol overnight.     Briefly was very alert this am. Later this afternoon, the pt is again very somnolent.    Physical Exam   Vital Signs: Temp: 97.8  F (36.6  C) Temp src: Oral BP: 127/51 Pulse: 91   Resp: 18 SpO2: 93 % O2 Device: Nasal cannula Oxygen Delivery: 2 LPM  Weight: 212 lbs 6.4 oz    Constitutional: Alert when his wife is around.  He seemed more interactive and appropriate with her present.  Otherwise he seemed to fade in and out when I was talking to him without her.  Eyes: extra-ocular muscles intact and sclera clear  Respiratory: Rhonchorous breathing noted particularly on the right.  Good air entry.  No dominic rales or egophony.  No wheezing.  Cardiovascular: regular rate and rhythm and no murmur noted  GI: Soft, nontender except for some tenderness in the right upper quadrant just off the midline.  There seems to be some fullness in this area.  Skin: no bruising or bleeding and normal skin color, texture, turgor  Musculoskeletal: bilat LE edema    Medical Decision Making     55 MINUTES SPENT BY ME on the date of service doing chart review, history, exam, documentation & further activities per the note.      Data   Results for orders placed or performed during the hospital encounter of 06/25/23 (from the past 24 hour(s))   EKG 12-lead, tracing only   Result Value Ref Range    Systolic Blood Pressure  mmHg    Diastolic Blood Pressure  mmHg    Ventricular Rate 68 BPM    Atrial Rate 68 BPM    WA Interval 180 ms    QRS Duration 80 ms     ms    QTc 414 ms    P Axis 39 degrees    R AXIS -19 degrees    T Axis 48 degrees    Interpretation ECG       Sinus rhythm  with Premature supraventricular complexes  Cannot rule out Inferior infarct , age undetermined  Anteroseptal infarct , age undetermined  Abnormal ECG     Basic metabolic panel   Result Value Ref Range    Sodium 137 136 - 145 mmol/L    Potassium 5.0 3.4 - 5.3 mmol/L    Chloride 101 98 - 107 mmol/L    Carbon Dioxide (CO2) 29 22 - 29 mmol/L    Anion Gap 7 7 - 15 mmol/L    Urea Nitrogen 23.3 (H) 8.0 - 23.0 mg/dL    Creatinine 1.17 0.67 - 1.17 mg/dL    Calcium 9.4 8.8 - 10.2 mg/dL    Glucose 144 (H) 70 - 99 mg/dL    GFR Estimate 62 >60 mL/min/1.73m2   MRSA MSSA PCR, Nasal Swab    Specimen: Nares, Bilateral; Swab   Result Value Ref Range    MRSA Target DNA Negative Negative    SA Target DNA Negative     Narrative    [employer]  Xpert SA Nasal Complete assay performed in the [employer]  Dx System is a qualitative in vitro diagnostic test designed for rapid detection of Staphylococcus aureus (SA) and methicillin-resistant Staphylococcus aureus (MRSA) from nasal swabs in patients at risk for nasal colonization. The test utilizes automated real-time polymerase chain reaction (PCR) to detect MRSA/SA DNA. The Xpert SA Nasal Complete assay is intended to aid in the prevention and control of MRSA/SA infections in healthcare settings. The assay is not intended to diagnose, guide or monitor treatment for MRSA/SA infections, or provide results of susceptibility to methicillin. A negative result does not preclude MRSA/SA nasal colonization.    Basic metabolic panel   Result Value Ref Range    Sodium 140 136 - 145 mmol/L    Potassium 4.9 3.4 - 5.3 mmol/L    Chloride 103 98 - 107 mmol/L    Carbon Dioxide (CO2) 31 (H) 22 - 29 mmol/L    Anion Gap 6 (L) 7 - 15 mmol/L    Urea Nitrogen 19.2 8.0 - 23.0 mg/dL    Creatinine 0.95 0.67 - 1.17 mg/dL    Calcium 9.4 8.8 - 10.2 mg/dL    Glucose 145 (H) 70 - 99 mg/dL    GFR Estimate 79 >60 mL/min/1.73m2   CBC with platelets   Result Value Ref Range    WBC Count 58.1 (HH) 4.0 - 11.0 10e3/uL    RBC  Count 2.20 (L) 4.40 - 5.90 10e6/uL    Hemoglobin 8.2 (L) 13.3 - 17.7 g/dL    Hematocrit 26.3 (L) 40.0 - 53.0 %     (H) 78 - 100 fL    MCH 37.3 (H) 26.5 - 33.0 pg    MCHC 31.2 (L) 31.5 - 36.5 g/dL    RDW 16.6 (H) 10.0 - 15.0 %    Platelet Count 159 150 - 450 10e3/uL   Extra Tube    Narrative    The following orders were created for panel order Extra Tube.  Procedure                               Abnormality         Status                     ---------                               -----------         ------                     Extra Red Top Tube[[id number]]                               Final result                 Please view results for these tests on the individual orders.   Extra Red Top Tube   Result Value Ref Range    Hold Specimen [facility]    Blood gas venous   Result Value Ref Range    pH Venous 7.29 (L) 7.32 - 7.43    pCO2 Venous 70 (H) 40 - 50 mm Hg    pO2 Venous 39 25 - 47 mm Hg    Bicarbonate Venous 33 (H) 21 - 28 mmol/L    Base Excess/Deficit (+/-)      FIO2 3

## 2023-06-27 NOTE — PROVIDER NOTIFICATION
XCover paged for pt urinating in the room on the floor. Pt has increased confusion and agitation, A+O x1 to self only.     Around 0300 staff was alerted to the bed alarm and found pt standing at bedside urinating onto the floor, his feet and IV pole.    At 0400, staff was alerted to the chair alarm and found pt standing and then sitting on the chair urinating on various medical equipment, himself, and the floor.     RN is concerned for infection and contamination at this time- all IV tubing is being replaced d/t urine.     Danielle Phan RN 06/27/2023 4:12 AM

## 2023-06-27 NOTE — PLAN OF CARE
"Goal Outcome Evaluation:      Plan of Care Reviewed With: patient    Overall Patient Progress: no change    Outcome Evaluation: .    Patient calm with confusion. Visual hallucinations occurring, wife stated \"He was killing bugs that weren't there on his tray table\" and patient states \"I see pillows on the walls\", MD paged and aware. EKG completed. Lab work drawn. VS stable, on O2, have been able to wean from 4 L to 2 L per oxymizer cannula with sats maintaining above 90%. Denies SOB. Tele SR. Right port CDI, with IV fluids running at 50Ml/hr per MD orders. CHG bath completed. Left PIV CDI, flushes well. Pt on IV vancomycin and Cefepime. Denies pain. Nasal swab collected for MRSA/MMSA. Result pending. Infectious disease following. Seizure pads placed on side rales.   "

## 2023-06-27 NOTE — PROGRESS NOTES
[facility]  Infectious Disease Progress Note          Assessment and Plan:   Assessment & Plan  Mulugeta Castillo is a 82 year old male who was admitted on 6/25/2023.      Impression: 1 82-year-old male with underlying lung cancer, port in [facility], presents with recurrent rigors over the last couple of days, multiple signs of probable infection, blood cultures so far negative in the [facility] system other clear focal infection site  2 lung cancer on chemotherapy with a port in place, major leukemoid reaction did get a dose of Neupogen  3 acute renal insufficiency  4 prior bladder cancer     REC 1 continue broad coverage but can considerable chance that cefepime is contributing to the major encephalopathy so discontinue that and go to Zosyn.  Off Vanco for now.  Blood cultures at [facility] are negative at near 48 hours.  If they remain negative mostly eliminates the concern for port infection etc.  2 very encephalopathic probably multifactorial.              Interval History:   no new complaints confused, required meds during the night, very sleepy now.  No focal pain site.  Temp is down Paige blood cultures are negative no new localizing symptoms ongoing leukemoid reaction presently due to Neupogen              Medications:       amiodarone  200 mg Oral Daily     aspirin  81 mg Oral Daily     [START ON 6/30/2023] buprenorphine  1 patch Transdermal Weekly    And     buprenorphine   Transdermal Q8H ASIM     finasteride  5 mg Oral Daily     folic acid  400 mcg Oral Daily     heparin  5-10 mL Intracatheter Q28 Days     heparin lock flush  5-10 mL Intracatheter Q24H     ipratropium-albuterol  1 puff Inhalation 4x daily     lidocaine  1 patch Transdermal Q24H    And     lidocaine   Transdermal Q8H ASIM     magnesium oxide  400 mg Oral TID     methylphenidate  5 mg Oral QAM     metoprolol tartrate  12.5 mg Oral BID     OLANZapine zydis  5 mg Oral Daily with supper     pantoprazole  40 mg Oral  "QAM AC     piperacillin-tazobactam  3.375 g Intravenous Q6H     pregabalin  100 mg Oral Daily     [Held by provider] pregabalin  150 mg Oral Daily     QUEtiapine  12.5 mg Oral QPM     sodium chloride (PF)  10-20 mL Intracatheter Q28 Days     sodium chloride (PF)  3 mL Intracatheter Q8H     tamsulosin  0.4 mg Oral QPM                  Physical Exam:   Blood pressure 126/84, pulse 71, temperature 97.8  F (36.6  C), temperature source Axillary, resp. rate 18, height 1.803 m (5' 11\"), weight 96.3 kg (212 lb 6.4 oz), SpO2 97 %.  Wt Readings from Last 2 Encounters:   06/27/23 96.3 kg (212 lb 6.4 oz)   01/10/22 94.9 kg (209 lb 4.8 oz)     Vital Signs with Ranges  Temp:  [97.5  F (36.4  C)-97.9  F (36.6  C)] 97.8  F (36.6  C)  Pulse:  [68-91] 71  Resp:  [18-20] 18  BP: (109-139)/(39-84) 126/84  SpO2:  [54 %-100 %] 97 %    Constitutional: sleepy, no apparent distress looks chronically ill   Lungs: Clear to auscultation bilaterally, no crackles or wheezing   Cardiovascular: Regular rate and rhythm, normal S1 and S2, and no murmur noted   Abdomen: Normal bowel sounds, soft, non-distended, non-tender   Skin: No rashes, no cyanosis, some edema   Other:           Data:   All microbiology laboratory data reviewed.  Recent Labs   Lab Test 06/27/23  0655 06/26/23  0652 06/26/23  0126   WBC 58.1* 77.4* 84.4*   HGB 8.2* 8.3* 8.3*   HCT 26.3* 27.6* 25.5*   * 123* 117*    163 150     Recent Labs   Lab Test 06/27/23  0655 06/26/23  1845 06/26/23  0652   CR 0.95 1.17 1.29*     No lab results found.  No lab results found.    Invalid input(s):     "

## 2023-06-27 NOTE — PLAN OF CARE
Cared for 0980-1316    PSC at bedside    Pt Alert to self & place. VSS, pt denies pain, headache, dizziness, & N/V. Pt dyspnic upon exertion, lung sounds diminished/coarse 2L O2 Oxymask. Pt Asst: 2 w/lift. Tele: SR (HR 90s). Port access (NOT Power Port - do not draw labs). ID following. New Palliative consult. Pre-existing wound on buttocks - healing, pink, new foam dressing applied. Will continue with plan of care.

## 2023-06-27 NOTE — PROVIDER NOTIFICATION
"  MD paged    \"Pt is very lethargic, would you please change the Lasix to IV form?\"    MD said to hold the Lasix until tomorrow.  "

## 2023-06-27 NOTE — PROGRESS NOTES
Cross cover note    Ordered Haldol 2 mg IV every 6 hours as needed for agitation.  Patient has increased confusion and agitation and urinating all over the floor.    Benson Jeronimo MD  Internal Medicine Hospitalist  Pager: [phone]

## 2023-06-27 NOTE — PROGRESS NOTES
"SPIRITUAL HEALTH SERVICES Progress Note (Palliative focus)  [facility] Unit 3Med/Surg    Referral Source: Palliative team    Introduced self and SHS to Mulugeta and his spouse, Leyda, at bedside - Pat responded, \"He's [Mulugeta] having a problem right now.. He just woke up and just keeps saying he wants to go home.\" Provided emotional support to Pt and spouse prior to bedside RN's arrival in room. Pat welcomed prayers. Provided family with palliative team phone number at RN's request.    Plan: Leyda welcomes a SH visit tomorrow. I will communicate her request to SH team. SHS remains available.    Melba Melvin MDiv  Chaplain Resident  [facility] Pager: [phone]    SHS available 24/7 for emergent requests/referrals, either by having the on-call  paged or by entering an ASAP/STAT consult in Epic (this will also page the on-call ).    "

## 2023-06-27 NOTE — PLAN OF CARE
"Care Continued: 2300 - 0730  Goal Outcome Evaluation:    Goal: Absence of Hospital-Acquired Illness or Injury  Outcome: Not Progressing  Intervention: Identify and Manage Fall Risk    Pt was found today standing at bedside several times without calling  Staff was alerted to alarms and assisted pt safely back to bed  Pt displayed confusion and agitation this shift, PRN haldol given     /61 (BP Location: Right arm)   Pulse 76   Temp 97.9  F (36.6  C) (Oral)   Resp 18   Ht 1.803 m (5' 11\")   Wt 97.3 kg (214 lb 8.1 oz)   SpO2 100%   BMI 29.92 kg/m      VSS, no critical changes this shift   Remains on O2 with NC     Please see additional note for additional information on pt urinating on items in the room and on the floor.     Increased rounding for visualization and safety checks on pt, monitor and evaluate for ongoing delirium. RN will continue to maintain care and the treatment plan this shift, handing off care to oncoming RN at 0700 06/27/2023     Danielle Phan RN 06/27/2023 6:13 AM         "

## 2023-06-28 NOTE — PLAN OF CARE
Goal Outcome Evaluation:      Plan of Care Reviewed With: family    Overall Patient Progress: no change     Temp: 97.8  F (36.6  C) Temp src: Axillary BP: (!) 144/62 Pulse: 88   Resp: 18 SpO2: 95 % O2 Device: Nasal cannula Oxygen Delivery: 1 LPM     Pt A/O x2, disoriented to situation and time, has mild dementia. On 1L of O2 NC. C/o abdominal pain d/t urinary retention. Bladder scanned 477 ml. Pt voided 200 ml and pain was relieved. Inc of stool and urine, has ext cath in place. On PO Lasix, voided a total of 600 ml this shift. On IV Zosyn Q6H. Right port heparin locked. Up 2A lift. Will continue POC.

## 2023-06-28 NOTE — PROGRESS NOTES
PALLIATIVE CARE SOCIAL WORK Progress Note   Location: [facility]      Joint visit with Palliative NP Franco Da Silva. Met with Pt for check in and support. He was alert and oriented, talkative today. He reports feeling better and less confused. He is hopeful that he will continue to improve so he can get home and take a trip to [place]. Visited again later with Pts wife Mona. Provided supportive presence and life review.      Plan: PCSW is available if further needs arise.     Clinical Social Work Interventions:   Life review facilitation  Other: general emotional support      Kayleigh Robles [facility]  [facility], Palliative Care  Securely message with the [place] Web Console (learn more here) or  Text page via [facility] Paging/Directory

## 2023-06-28 NOTE — PROGRESS NOTES
"[facility] RCAT     Date: 6/25/2023  Admission Dx: acute hypoxic respiratory failure   Pulmonary History: small cell lung CA.   Home Nebulizer/MDI Use: Albuterol MDI prn  Home Oxygen: NA  Acuity Level (RCAT flow sheet): 4  Aerosol Therapy initiated: combivent QID prn      Pulmonary Hygiene initiated: deep breath and coughing technique       Volume Expansion initiated: IS      Current Oxygen Requirements: 2 lpm NC  Current SpO2: 95%    Re-evaluation date: 7/1/2023    Patient Education: spoke about the indications of bronchodilators.       See \"RT Assessments\" flow sheet for patient assessment scoring and Acuity Level Details.                 "

## 2023-06-28 NOTE — PROGRESS NOTES
[facility]    Medicine Progress Note - Hospitalist Service    Date of Admission:  6/25/2023    Assessment & Plan  Per Dr Saldana...    Mulugeta Castillo is a 82 year old male came to attention on 6/25/2023 due to increased somnolence.    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  He has been on multiple chemotherapeutic agents and most recently started on Zepzelca just a couple of days ago prior to admission.  I note that Ms. Castillo reports that the patient had been remarkably alert and active until he became confused and somnolent.     He initially presented to the [facility] emergency department where he was assessed and found to be hypoxic with a significantly white cell count, acute kidney injury and hyperkalemia.  He was treated for the hyperkalemia and started on broad-spectrum antibiotics after blood and urine cultures were obtained.  [facility], the patient was admitted to [facility].      Diagnoses:  1.  Acute hypoxic respiratory failure due to lung cancer/Suspected post-obstructive pneumonia.  Procal elevated to 7.95. CT Chest report equivocal.  Patient has been on antibiotics with cefepime and switched to Zosyn in case cefepime is contributing to confusion.  Patient seems to be doing better today still having some yellow sputum.  We will keep on IV antibiotics 1 more day and switch to Augmentin tomorrow  2.  Toxic/metabolic encephalopathy with hallucinations/behavior issues.  Patient has had confusion while at the hospital starting with his pneumonia, renal failure, and medications.  Been treated with Zyprexa and Seroquel.  We will stop his Zyprexa at this point.  Patient also required Haldol.  Mr. Castillo was fully awake and fully appropriate this am.  Only having some memory recall issues but otherwise seems to be getting  ` ` Patient Information     Patient Name Sex     Giselle Chino ([id number]) Female 1938       Room Bed    0303 0303-01      Patient Demographics     Address Phone    [address]  [place] [address] [phone] (Home) *Preferred*  none (Work)  [phone] (Mobile)      Patient Ethnicity & Race     Ethnic Group Patient Race    American White      Emergency Contact(s)     Name Relation Home Work Mobile    Flavio Chino Spouse   [phone]    Xi Kelly Daughter [phone]        Documents on File        Status Date Received Description       Documents for the Patient    Privacy Notice - [place] Received 13     Insurance Card Received () 17 [employer]    External Medication Information Consent Accepted () 14     Patient ID Received () 14 [place] exp 2017    Consent for Services - Hospital/Clinic Received () 13     Consent for EHR Access  13 Copied from existing Consent for services - IP/ED collected on 2012    [facility] Specified Other       [facility] Authorization  13 [facility]    Consent to Communicate Received 14     Consent for Services - Hospital/Clinic Received () 01/06/15     Insurance Card Received 18 [employer]    Patient ID Received () 16 [place] DL Expires 2017    Consent for Services/Privacy Notice - Hospital/Clinic Received () 16     Consent for Services - [facility] Received 18     Consent for Services/Privacy Notice - Hospital/Clinic-Esign  ()      Patient ID Received () 17 [place] exp 2017    Insurance Card Received () 17 Scan Refused    External Medication Information Consent Accepted 17     HIM [address] Authorization  () 17 Authorization for batch Ciox  sul   [id number]    Care Everywhere Prospective Auth Received 17     Consent for Services/Privacy Notice - Hospital/Clinic Received 17      Consent for Services - Hospital and Clinic Received 18     HIE Auth Received 18     Patient ID Received 18     HIM COURT Authorization  () 18 Authorization for batch CIOX        Documents for the Encounter    CMS IM for Patient Signature Received 18     Discharge Attachment   ACUTE RESPIRATORY DISTRESS SYNDROME (ARDS), UNDERSTANDING (ENGLISH)    Discharge Attachment   ABDOMINAL AORTIC ANEURYSM (STABLE) (ENGLISH)    Discharge Attachment   COPD, TREATMENT FOR (ENGLISH)    Discharge Attachment  (Deleted)  (S) TREATING A THORACIC AORTIC ANEURYSM (TAA): ENDOVASCULAR GRAFT (ENGLISH)    Discharge Attachment  (Deleted)  DEEP BREATHING (ENGLISH)      Admission Information     Attending Provider Admitting Provider Admission Type Admission Date/Time    Pascual Guzman MD Foehrenbacher, Matthew Henry, MD Emergency 18  1638    Discharge Date Hospital Service Auth/Cert Status Service Area     General Medicine [facility]    Unit Room/Bed Admission Status        3 MEDICAL SURGICAL 3/0303- Admission (Confirmed)       Admission     Complaint    Hypoxemic respiratory failure, chronic (H)      Hospital Account     Name Acct ID Class Status Primary Coverage    Giselle Chino [id number] Inpatient Open UCARE - [employer] FOR SENIORS            Guarantor Account (for Hospital Account #[id number])     Name Relation to Pt Service Area Active? Acct Type    Giselle Chino Self FCS Yes Personal/Family    Address Phone          [address] [place] [address] [address]  [place] [address] [phone](H)              Coverage Information (for Hospital Account #[id number])     F/O Payor/Plan Precert #    UCARE/UCARE FOR SENIORS     Subscriber Subscriber #    Giselle Chino [id number]    Address Phone    [address]  [place] [id number] [phone]       close to his baseline.  This point no need for brain imaging.  He had a virtually normal MRI of his brain on 5/5/23.  (His wife brought the report today.)  3.  Acute renal failure with hyperkalemia.  The high K was managed while patient was at the [place] emergency department.  EDITH is common with the Zepzelka which the patient just started last Thursday.  Patient's creatinine is down to 0.95.  Had a peak of 1.44.  Suspect that this was due to being prerenal along with his recent Zepzelka.  4.  Chronic left-sided chest pain associated with left-sided lung cancer for which the patient is on buprenorphine and pregabablin.  Given the patient's confusion could consider dropping the dose but the patient seems to be doing better so we will continue this for now.  5.  Atrial fibrillation for which patient is on amiodarone as well as Lopressor for rate control.  6.  EDITH with hyperkalemia.  Resolved.   7.  Marked leukocytosis.  Due to exposure to G-CSF which was just administered on Saturday.  Trending toward normal.   8.  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  Previous gamma knife surgery for brain metastases.  Patient's biggest goal is to get to his condo in [facility].  Unclear if the patient is going to be able to do this.  9.  Hypertension: On Lopressor  10. Hx bladder cancer, not currently in treatment.   11.  Edema: Is on Lasix  12.  BPH: Is on Proscar      PLAN:  1.  Consider switching Zosyn to Augmentin tomorrow if he continues to do well.  2.  Discontinue Zyprexa/Haldol as the patient is doing better at this point.  3.  Consider cutting Seroquel as well if he continues to improve and getting rid of Ritalin tomorrow.  4.  Discontinue Lidoderm patch as there is no indication for this  5.  Goal for Oxygen sat is > 88, but < 95.  Continue on oxygen as necessary.  6.  Was made DNR/I per palliative care  7.  Polypharmacy: Patient is on multiple medications at home which could be contributing to side  "effects and confusion.  The patient's age and poor prognosis should consider weaning down his medications.  8.  Deconditioning: PT and OT to see the patient.  I suspect the patient is getting require TCU but he is from [facility] and wants to go home.  Not sure if he would even be able to get home care there.       Diet: Combination Diet Regular Diet Adult    DVT Prophylaxis: Pneumatic Compression Devices  Chiang Catheter: Not present  Lines: p  Port a Cath 06/26/23 Single Lumen Right Chest wall-Site Assessment: WDL      Cardiac Monitoring: None  Code Status: No CPR- Do NOT Intubate        # Obesity: Estimated body mass index is 31.73 kg/m  as calculated from the following:    Height as of this encounter: 1.803 m (5' 11\").    Weight as of this encounter: 103.2 kg (227 lb 8.2 oz)., PRESENT ON ADMISSION          Disposition Plan     Expected Discharge Date: 06/29/2023                  Discussed with nursing, social work, case management, left message for wife Leyda by phone.    Patient new to me today, reviewed the patient's record, hospital course, orders and medications.    Jackson Heredia MD  Hospitalist Service  [facility]  Securely message with [employer] (more info)  Text page via [facility] Paging/Directory   ______________________________________________________________________    Interval History   Patient is having more edema in his legs bilaterally.  He is more alert but still having a little bit of recall issues.  Still having some yellow sputum.  Still is on 2 L of oxygen satting at 93%.  Patient's behaviors have improved.  He is less confused.  He is eating okay.    Physical Exam   Vital Signs: Temp: 97.8  F (36.6  C) Temp src: Oral BP: 134/56 Pulse: 85   Resp: 16 SpO2: 95 % O2 Device: Nasal cannula Oxygen Delivery: 2 LPM  Weight: 227 lbs 8.24 oz    Constitutional: Alert and oriented x2 which is improved.  He seemed more interactive, alert and appropriate.    HEENT: MMM " today  Respiratory: Rhonchorous breathing noted particularly on the right.  Decreased breath sounds on the left, good air entry.  No current crackles nor wheezing.  Moderate air movement  Cardiovascular: regular rate and rhythm and no murmur noted  GI: Soft, nontender, not distended, bowel sounds present throughout   Musculoskeletal: bilat LE edema 2+ bilateral lower extremity up to the knee      Data   Results for orders placed or performed during the hospital encounter of 06/25/23 (from the past 24 hour(s))   Blood gas venous   Result Value Ref Range    pH Venous 7.29 (L) 7.32 - 7.43    pCO2 Venous 70 (H) 40 - 50 mm Hg    pO2 Venous 39 25 - 47 mm Hg    Bicarbonate Venous 33 (H) 21 - 28 mmol/L    Base Excess/Deficit (+/-)      FIO2 3

## 2023-06-28 NOTE — PLAN OF CARE
7570-6024: VSS. Disoriented x3. Pleasant and redirectable. Neuros intact and unchanged. Reported generalized pain; prn tylenol given. Oxygen needs between 1-2L via nc on nights; on continuous pulse ox, keep spo2 between 88-94% per MD. Ax2 lift. PIV & chest port intact. Received iv zosyn. Incontinent; ext cath cares completed and changed. Safety checks completed and call light within reach. Will continue to monitor

## 2023-06-28 NOTE — PROGRESS NOTES
PALLIATIVE CARE PROGRESS NOTE  [facility]     Patient Name: Mulugeta Castillo  Date of Admission: 6/25/2023   Today the patient was seen for: [facility] support     Recommendations & Counseling     GOALS OF CARE:     Life-prolonging with limits     Hoping for medical optimization and improvement. Hoping to return home and back to previous level of function     Change code status to DNR/DNI. Would want all efforts to improve health except if he would need artificial means to sustain life     ADVANCE CARE PLANNING:    Advance Directive has been requested.    There is no POLST form on file, plan to complete prior to DC.    Code status: No CPR- Do NOT Intubate     MEDICAL MANAGEMENT:   #Delirium, improved     Avoid benzodiazepines, antihistamines, anticholinergics if able.    Lights on and blinds open during the day.  Reorient frequently.    Lights & TV off during the night.  Promote normal circadian rhythm.    Limit sensory deprivation - utilize hearing aids, glasses, etc.    Frequently assess basic needs such as temperature, elimination, thirst/hunger, pain    Consider bedside attendant (if able) for additional safety    Quetiapine (Seroquel) start at HS         PSYCHOSOCIAL/SPIRITUAL SUPPORT:    Family     Friends    [place] community: [place]     Would appreciate Spiritual Health Services     Palliative Care will continue to follow. Thank you for the consult and allowing us to aid in the care of Mulugeta Castillo.    Franco Da Silva NP  Securely message with E[employer] Messenger (more info)  Text page via [facility] Paging/Directory           Assessments          Mulugeta Castillo is a 82 year old male with past medical history significant for coronary disease, hypertension, hyperlipidemia, GERD, paroxysmal atrial fibrillation, bladder cancer, lung cancer metastatic to brain Mulugeta Castillo is a 82 year old male with past medical history significant for coronary disease, hypertension, hyperlipidemia, GERD, paroxysmal atrial fibrillation,  "bladder cancer, lung cancer metastatic to brain, however most recent scan in May show no acute brain abnormalities     Prognosis, Goals, or Advance Care Planning was addressed today with: Yes.  Mood, coping, and/or meaning in the context of serious illness were addressed today: Yes.              Interval History:     Chart review/discussion with unit or clinical team members:   Course reviewed. No significant events overnight and no new medical concerns today.     Per patient or family/caregivers today:  Continues to improve.  Overall, doing well. Mentation greatly improved. Still feeling \"foggy\". Denies shortness of breath, palpitations, nausea or vomiting. Pain is well managed with current treatment plan.             Review of Systems:     Besides above, an additional  system ROS was reviewed and is unremarkable               Physical Exam:   Temp: 97.8  F (36.6  C) Temp src: Oral Temp  Min: 97.8  F (36.6  C)  Max: 99.3  F (37.4  C) BP: 134/56 Pulse: 85   Resp: 16 SpO2: 95 % O2 Device: Nasal cannula Oxygen Delivery: 2 LPM  Vital Signs with Ranges  Temp:  [97.8  F (36.6  C)-99.3  F (37.4  C)] 97.8  F (36.6  C)  Pulse:  [] 85  Resp:  [16-20] 16  BP: (126-144)/(43-84) 134/56  SpO2:  [91 %-99 %] 95 %  227 lbs 8.24 oz    Physical Exam  Vitals and nursing note reviewed.   Constitutional:       General: He is not in acute distress.  HENT:      Head: Normocephalic.      Mouth/Throat:      Mouth: Mucous membranes are moist.      Pharynx: Oropharynx is clear.   Eyes:      Extraocular Movements: Extraocular movements intact.      Conjunctiva/sclera: Conjunctivae normal.      Pupils: Pupils are equal, round, and reactive to light.   Cardiovascular:      Rate and Rhythm: Normal rate and regular rhythm.      Pulses: Normal pulses.   Pulmonary:      Effort: Pulmonary effort is normal. No respiratory distress.      Comments: Productive cough with white/yellow sputum   Abdominal:      General: Abdomen is flat. There is no " distension.      Tenderness: There is no abdominal tenderness.   Musculoskeletal:         General: Normal range of motion.   Skin:     General: Skin is warm and dry.      Capillary Refill: Capillary refill takes less than 2 seconds.   Neurological:      General: No focal deficit present.      Mental Status: He is alert. Mental status is at baseline.   Psychiatric:         Mood and Affect: Mood normal.         Thought Content: Thought content normal.                  Current Problem List:   Principal Problem:    Encephalopathy      Allergies   Allergen Reactions     Diltiazem Rash     Other reaction(s): Blistering rash  Skin peeling  Other reaction(s): Blistering rash  Skin peeling       Eliquis [Apixaban] Other (See Comments)     Nose bleeds and rash     Doxycycline Unknown and Rash     Rapid heart beat ????  Rapid heart beat ????       Tetracycline Rash            Data Reviewed:       Data   Results for orders placed or performed during the hospital encounter of 06/25/23 (from the past 24 hour(s))   Blood gas venous   Result Value Ref Range    pH Venous 7.29 (L) 7.32 - 7.43    pCO2 Venous 70 (H) 40 - 50 mm Hg    pO2 Venous 39 25 - 47 mm Hg    Bicarbonate Venous 33 (H) 21 - 28 mmol/L    Base Excess/Deficit (+/-)      FIO2 3    *      -----------------------------------------------------------------------------------------------------------------          ====================================================  TT: I have personally spent a total of 45 minutes on the date of the encounter,  on the unit in review of medical record, consultation with the medical providers and assessment of Mulugeta Castillo  today, with more than 50% of this time spent in counseling, coordination of care, and discussion re: diagnostic results, prognosis, symptom management, risks and benefits of management options, and development of plan of care as noted above.      ====================================================

## 2023-06-28 NOTE — PROGRESS NOTES
[facility]  Infectious Disease Progress Note          Assessment and Plan:   Assessment & Plan  Mulugeta Castillo is a 82 year old male who was admitted on 6/25/2023.      Impression: 1 82-year-old male with underlying lung cancer, [facility] in [facility], presents with recurrent rigors over the last couple of days, multiple signs of probable infection, blood cultures so far negative in the [facility] system other clear focal infection site  2 lung cancer on chemotherapy with a port in place, major leukemoid reaction did get a dose of Neupogen  3 acute renal insufficiency  4 prior bladder cancer     REC 1 continue broad coverage but  considerable chance that cefepime  contributing to the major encephalopathy so discontinue that and go to Zosyn.  Off Vanco for now.  Blood cultures at [facility] are negative at near 72 hours.  If they remain negative mostly eliminates the concern for port infection etc.  2 Less encephalopathic temp down and no other focal symptoms no clear focal infection, unclear whether even infection a major factor here multiple other possibilities complicated patient with a number of things occurring at once  Note palliative consult still life-prolonging care for now.              Interval History:   no new complaints confused, required meds during the night, very sleepy now.  No focal pain site.  Temp is down Paige blood cultures are negative no new localizing symptoms ongoing leukemoid reaction presently due to Neupogen              Medications:       amiodarone  200 mg Oral Daily     aspirin  81 mg Oral Daily     [START ON 6/30/2023] buprenorphine  1 patch Transdermal Weekly    And     buprenorphine   Transdermal Q8H [facility]     finasteride  5 mg Oral Daily     folic acid  400 mcg Oral Daily     furosemide  20 mg Oral Daily     heparin  5-10 mL Intracatheter Q28 Days     heparin lock flush  5-10 mL Intracatheter Q24H     magnesium oxide  400 mg Oral TID     methylphenidate  5  "mg Oral QAM     metoprolol tartrate  12.5 mg Oral BID     pantoprazole  40 mg Oral QAM AC     piperacillin-tazobactam  3.375 g Intravenous Q6H     pregabalin  100 mg Oral Daily     [Held by provider] pregabalin  150 mg Oral Daily     QUEtiapine  12.5 mg Oral QPM     sodium chloride (PF)  10-20 mL Intracatheter Q28 Days     sodium chloride (PF)  3 mL Intracatheter Q8H     tamsulosin  0.4 mg Oral QPM                  Physical Exam:   Blood pressure 134/56, pulse 85, temperature 97.8  F (36.6  C), temperature source Oral, resp. rate 16, height 1.803 m (5' 11\"), weight 103.2 kg (227 lb 8.2 oz), SpO2 95 %.  Wt Readings from Last 2 Encounters:   06/28/23 103.2 kg (227 lb 8.2 oz)   01/10/22 94.9 kg (209 lb 4.8 oz)     Vital Signs with Ranges  Temp:  [97.8  F (36.6  C)-99.3  F (37.4  C)] 97.8  F (36.6  C)  Pulse:  [] 85  Resp:  [16-18] 16  BP: (126-144)/(43-84) 134/56  SpO2:  [91 %-99 %] 95 %    Constitutional: sleepy, no apparent distress looks chronically ill   Lungs: Clear to auscultation bilaterally, no crackles or wheezing   Cardiovascular: Regular rate and rhythm, normal S1 and S2, and no murmur noted   Abdomen: Normal bowel sounds, soft, non-distended, non-tender   Skin: No rashes, no cyanosis, some edema   Other:           Data:   All microbiology laboratory data reviewed.  Recent Labs   Lab Test 06/27/23  0655 06/26/23  0652 06/26/23  0126   WBC 58.1* 77.4* 84.4*   HGB 8.2* 8.3* 8.3*   HCT 26.3* 27.6* 25.5*   * 123* 117*    163 150     Recent Labs   Lab Test 06/27/23  0655 06/26/23  1845 06/26/23  0652   CR 0.95 1.17 1.29*     No lab results found.  No lab results found.    Invalid input(s):     "

## 2023-06-28 NOTE — PLAN OF CARE
Cared for [id number]     Pt Alert to self & place - more alert today. VSS, pt denies pain, headache, dizziness, & N/V. Pt dyspnic upon exertion, lung sounds diminished/coarse 2L O2 Oxymask. Pt Asst: 2 w/lift. Port access (NOT Power Port - do not draw labs). IV Zosyn. Butrans patch in-place Left chest/side. ID & Palliative following. Pre-existing wound on buttocks - healing, pink, new foam dressing applied. Will continue with plan of care.    New PT & OT consults

## 2023-06-28 NOTE — PROGRESS NOTES
"SPIRITUAL HEALTH SERVICES Progress Note  [facility]. [address] med-tele    Saw pt Mulugeta Castillo per follow up. Visit with Mulugeta and his wife Leyda.    Patient/Family Understanding of Illness and Goals of Care - Mulugeta began with a smile stating he feels much better today, that he walked up and back the hallway \"so well I could have just walked out of the hospital.\"  Leyda shared about Mulugeta's \"shaking\" that brought him to the hospital and that they are working to find what caused this and if it's related to his cancer treatment.    Distress and Loss -     Strengths, Coping, and Resources - Mulugeta spoke about \"taking things in stride\" and staying positive as being important as he begins chemotherapy again    Meaning, Beliefs, and Spirituality - Mulugeta and Leyda state they have many friends and family praying, and welcomed a prayer for his healing.    Plan of Care -  Mulugeta and Leyda welcome  support.    Lilia Ortiz MDiv  Staff   [facility] routine referrals *[phone]  [facility] available 24/7 for emergent requests/referrals, either by having the on-call  paged or by entering an ASAP/STAT consult in Epic (this will also page the on-call ).    "

## 2023-06-28 NOTE — PROGRESS NOTES
06/28/23 4081   Appointment Info   Signing Clinician's Name / Credentials (PT) Gladis Ordaz, DPPIETRO   Living Environment   People in Home spouse   Current Living Arrangements house   Home Accessibility stairs within home;stairs to enter home   Number of Stairs, Main Entrance 4   Stair Railings, Main Entrance railings safe and in good condition   Number of Stairs, Within Home, Primary greater than 10 stairs   Stair Railings, Within Home, Primary railings on both sides of stairs   Living Environment Comments 1 level home with basement   Self-Care   Equipment Currently Used at Home cane, straight  (cane for out of the house)   Fall history within last six months no   Activity/Exercise/Self-Care Comment Pt has a walk in bathtub with grab bars. Pt was inde with dressing, showering, driving, grocery shopping, cooking and cleaning   General Information   Onset of Illness/Injury or Date of Surgery 06/25/23   Referring Physician Jackson Heredia MD   Patient/Family Therapy Goals Statement (PT) Pt is hopeful to DC home   Pertinent History of Current Problem (include personal factors and/or comorbidities that impact the POC) Mulugeta Castillo is a 82 year old male came to attention on 6/25/2023 due to increased somnolence.    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  He has been on multiple chemotherapeutic agents and most recently started on Zepzelca just a couple of days ago prior to admission.  I note that Ms. Castillo reports that the patient had been remarkably alert and active until he became confused and somnolent.      He initially presented to the [facility] emergency department where he was assessed and found to be hypoxic with a significantly white cell count, acute kidney injury and hyperkalemia.  He was treated for the hyperkalemia and started on broad-spectrum antibiotics after blood and urine cultures were obtained.  [facility], the patient was admitted to [facility].   Existing Precautions/Restrictions oxygen therapy device and L/min  (2L currently, non at [facility])   Cognition   Affect/Mental Status (Cognition) WFL   Orientation Status (Cognition) oriented x 3  (does not remember yesterday well)   Follows Commands (Cognition) WFL   Pain Assessment   Patient Currently in Pain Yes, see Vital Sign flowsheet   Integumentary/Edema   Integumentary/Edema Comments mild pitting in feet   Posture    Posture Forward head position;Protracted shoulders   Range of Motion (ROM)   ROM Comment WFL   Strength (Manual Muscle Testing)   Strength (Manual Muscle Testing) Deficits observed during functional mobility   Bed Mobility   Comment, (Bed Mobility) increased effort use of rail   Transfers   Comment, (Transfers) CGA   Gait/Stairs (Locomotion)   Comment, (Gait/Stairs) CGA, instability without AD, desat with activity   Balance   Balance Comments needing B UE support   Clinical Impression   Criteria for Skilled Therapeutic Intervention Yes, treatment indicated   PT Diagnosis (PT) decreased functional mobility   Influenced by the following impairments decreased strength, decreased CV response, decreased balance   Functional limitations due to impairments decreased bed mob, transfers, ambulation, stairs   Clinical Presentation (PT Evaluation Complexity) Stable/Uncomplicated   Clinical Presentation Rationale improving   Clinical Decision Making (Complexity) low complexity   Planned Therapy Interventions (PT) balance training;bed mobility training;gait training;home exercise program;patient/family education;strengthening;transfer training;progressive activity/exercise;risk factor education;home program guidelines   Anticipated Equipment Needs at Discharge (PT)   (wife reports has a walker at home they could have him use if needed)   Risk & Benefits of therapy have been explained  evaluation/treatment results reviewed;care plan/treatment goals reviewed;risks/benefits reviewed;current/potential barriers reviewed;participants voiced agreement with care plan;participants included;patient   PT Total Evaluation Time   PT Eval, Low Complexity Minutes (51700) 5   Physical Therapy Goals   PT Frequency Daily   PT Predicted Duration/Target Date for Goal Attainment 07/01/23   PT Goals Bed Mobility;Transfers;Gait;Stairs   PT: Bed Mobility Independent;Supine to/from sit   PT: Transfers Modified independent;Sit to/from stand;Bed to/from chair;Assistive device   PT: Gait Modified independent;Greater than 200 feet;Straight cane   PT: Stairs Modified independent;4 stairs;Rail on right;Assistive device   Interventions   Interventions Quick Adds Therapeutic Activity;Gait Training   Therapeutic Activity   Therapeutic Activities: dynamic activities to improve functional performance Minutes (97368) 15   Treatment Detail/Skilled Intervention Pt was cued for sit to stand from bed and recliner chair. Pt was cued for hand placement and technique with FWW as poor balance with out at this time. Pt cued for use of walker entire transfer. very mild SOB noted following ambulation- good wave form noted, but sxs don't match- regain quickly but in a very gradual linear fashion. Pt was cued for PLB following ambulation, resat to 90% in less than 1 min. Question accuracy?   Gait Training   Gait Training Minutes (79449) 15   Treatment Detail/Skilled Intervention Pt was cued for proximity to walker, feet inside walker. Pt was cued for improving posture, pt on 2 liters, desat to 77% with 100 feet of ambulation, see above.   PT Discharge Planning   PT Plan Trial SEC? Monitor O2 sats   PT Discharge Recommendation (DC Rec) home with assist;home with home care physical therapy;Leaving home requires significant taxing effort   PT Rationale for DC Rec Pt likely to meet basic mobility goals, some concern re: need for O2/desat today. Will  continue to monitor. Wife supportive and likely could assist with IADLs.   PT Brief overview of current status Pt able to ambulate 100 feet with FWW, desat 77%   Total Session Time   Timed Code Treatment Minutes 30   Total Session Time (sum of timed and untimed services) 35

## 2023-06-28 NOTE — PROGRESS NOTES
06/28/23 1500   Appointment Info   Signing Clinician's Name / Credentials (OT) Felicity CAROLINA HaywoodRL   Living Environment   People in Home spouse   Current Living Arrangements house   Home Accessibility stairs within home;stairs to enter home   Number of Stairs, Main Entrance 4   Stair Railings, Main Entrance railings safe and in good condition   Number of Stairs, Within Home, Primary greater than 10 stairs   Stair Railings, Within Home, Primary railings on both sides of stairs   Living Environment Comments Pt lives in a one level home with basement. Pt has a walk in bathtub with grab bars, elevated toilets.   Self-Care   Usual Activity Tolerance good   Current Activity Tolerance moderate   Regular Exercise No   Equipment Currently Used at Home cane, straight   Fall history within last six months no   Activity/Exercise/Self-Care Comment Pt reports independence with ADLs and mobility with cane   Instrumental Activities of Daily Living (IADL)   Previous Responsibilities meal prep;housekeeping;laundry;shopping;yardwork;medication management;finances;driving   IADL Comments Pt spouse able to assist with IADLS   General Information   Onset of Illness/Injury or Date of Surgery 06/25/23   Referring Physician Jackson Heredia MD   Patient/Family Therapy Goal Statement (OT) Patient would like to return to baseline, no have LE shaking   Additional Occupational Profile Info/Pertinent History of Current Problem 82 year old male came to attention on 6/25/2023 due to increased somnolence.       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  He has been on multiple chemotherapeutic agents and most recently started on Zepzelca just a couple of days ago.  I note that Ms. Castillo reports that the patient had been remarkably alert and active until Sunday, when he became confused and somnolent.      He initially presented to the [facility] emergency department where he  was assessed and found to be hypoxic with a significantly white cell count, acute kidney injury and hyperkalemia.  He was treated for the hyperkalemia and started on broad-spectrum antibiotics after blood and urine cultures were obtained.  [facility], the patient was admitted to [facility].   Existing Precautions/Restrictions fall;oxygen therapy device and L/min   Limitations/Impairments safety/cognitive;hearing   Cognitive Status Examination   Orientation Status person;place  (Pt oriented to year and month but required logical cuing for day of the week and date)   Follows Commands WFL   Memory Deficit moderate deficit   Attention Deficit minimal deficit   Executive Function Deficit minimal deficit   Cognitive Status Comments PT scored -7 on Short Blessed indicating mild cognitive impairment.   Cognitive Screens/Assessments   Cognitive Assessments Completed Blessed Orientation-Memory-Concentration   Blessed Orientation-Memory-Concentration Test:  Total Weighted Score out of 28 -7   Blessed Orientation-Memory-Concentration Test Norms 0-8 equals normal to mild impairment   Blessed Orientation-Memory-Concentration Interpretation Pt score indicates mild cognitive impairment with short term memory deficits noted.   Pain Assessment   Patient Currently in Pain Yes, see Vital Sign flowsheet  (Low back pain)   Posture   Posture forward head position;protracted shoulders   Range of Motion Comprehensive   Comment, General Range of Motion WFL   Strength Comprehensive (MMT)   Comment, General Manual Muscle Testing (MMT) Assessment Poor tolerance for activity   Bed Mobility   Comment (Bed Mobility) Pt seated in chair, not assessed this date, will assess at a later time   Transfers   Transfer Comments CGA   Balance   Balance Comments Mild impairment in standing   Activities of Daily Living   BADL Assessment/Intervention bathing;lower body  dressing;grooming;toileting   Bathing Assessment/Intervention   Comment, (Bathing) Min assist for stability, poor tolerance for activity per clinical judgement   Lower Body Dressing Assessment/Training   Comment, (Lower Body Dressing) SBA   Grooming Assessment/Training   Comment, (Grooming) Poor tolerance for activity in standing   Toileting   Comment, (Toileting) CGA-min assist per clinical judgement   Clinical Impression   Criteria for Skilled Therapeutic Interventions Met (OT) Yes, treatment indicated   OT Diagnosis Decline in ADL independence and safety   Influenced by the following impairments Small cell lung cancer, mets to brain   OT Problem List-Impairments impacting ADL problems related to;activity tolerance impaired;balance;cognition;mobility;strength;pain   Assessment of Occupational Performance 1-3 Performance Deficits   Identified Performance Deficits Impaired tolerance for ADLs including dressing bathing and toileting with cognitive decline   Planned Therapy Interventions (OT) ADL retraining;cognition;progressive activity/exercise   Clinical Decision Making Complexity (OT) low complexity   Anticipated Equipment Needs Upon Discharge (OT)   (TBD)   Risk & Benefits of therapy have been explained evaluation/treatment results reviewed;care plan/treatment goals reviewed;current/potential barriers reviewed;risks/benefits reviewed;participants voiced agreement with care plan;participants included;patient;spouse/significant other   OT Total Evaluation Time   OT Eval, Low Complexity Minutes (06658) 10   OT Goals   Therapy Frequency (OT) Daily   OT Predicted Duration/Target Date for Goal Attainment 07/03/23   OT Goals Hygiene/Grooming;Toilet Transfer/Toileting;OT Goal 1   OT: Hygiene/Grooming supervision/stand-by assist;while standing  (Pt will tolerate 10+ minutes of g/h in standing to progress activity tolerance needed for ADL independence)   OT: Toilet Transfer/Toileting Modified independent;toilet  transfer;cleaning and garment management;using adaptive equipment   OT: Goal 1 Patient and caregiver will verbalize understanding of EC principles including AE to improve pt safety and tolerance of ADLs.   Self-Care/Home Management   Self-Care/Home Mgmt/ADL, Compensatory, Meal Prep Minutes (56529) 24   Symptoms Noted During/After Treatment (Meal Preparation/Planning Training) fatigue   Treatment Detail/Skilled Intervention OT: Pt seated upright in chair upon OT arrival, finishing up with PT, agreeable to therapy. Patient and caregiver educated on results of the Short Blessed including deficits noted on short term memory. Pt and caregiver report chronic STM deficits at baseline, appears he has not been formally tested previously so unsure if this is his baseline level of impairment. Pt and caregiver educated on providing cognitive support for higher level IADLs including med management. Pt spouse able to assist. Pt completed sit to stand with CGA and cues for hand placement in walker, pt ambulated a few steps the ndemonstrated mild imbalance with tremors observed. Pt returned to the chair, UE and LE tremors worsened then improved with rest break. Pt nurse aware, states that pt has had these episode intermittently though significant less this date. began discussion regarding AE to improve Pt safety. Pt seated inchair with alarm on and alex llight upon OT departure   OT Discharge Planning   OT Plan OT: SLUMS, toilet transfer, g/h at sink, AE for home safety. pt intermittently tremoring impairing safety.   OT Discharge Recommendation (DC Rec) home with assist   OT Rationale for DC Rec Appears pt is nearing baseline, with continued medical management and inpatient OT, anticipate pt would be able to return home with assist with higher level cognitive tasks including medication management.   OT Brief overview of current status CGA sit to stand, tremor impairing stability

## 2023-06-29 NOTE — PROGRESS NOTES
PALLIATIVE CARE PROGRESS NOTE  [facility]     Patient Name: Mulugeta Castillo  Date of Admission: 6/25/2023   Today the patient was seen for: [facility] support     Recommendations & Counseling     GOALS OF CARE:     Life-prolonging with limits     Hoping for medical optimization and improvement. Hoping to return home and back to previous level of function     Change code status to DNR/DNI. Would want all efforts to improve health except if he would need artificial means to sustain life. Considering TF.      ADVANCE CARE PLANNING:    Advance Directive has been requested.    There is no POLST form on file, completed today and sent to [facility]     Code status: No CPR- Do NOT Intubate     MEDICAL MANAGEMENT:   #Delirium, improved     Avoid benzodiazepines, antihistamines, anticholinergics if able.    Lights on and blinds open during the day.  Reorient frequently.    Lights & TV off during the night.  Promote normal circadian rhythm.    Limit sensory deprivation - utilize hearing aids, glasses, etc.    Frequently assess basic needs such as temperature, elimination, thirst/hunger, pain    Consider bedside attendant (if able) for additional safety    Quetiapine (Seroquel) start at HS         PSYCHOSOCIAL/SPIRITUAL SUPPORT:    Family     Friends    [facility] community: [place]     Would appreciate Spiritual Health Services     Palliative Care will continue to follow. Thank you for the consult and allowing us to aid in the care of Mulugeta Castillo.    Franco Da Silva NP  Securely message with [employer] (more info)  Text page via [facility] Paging/Directory           Assessments          Mulugeta Castillo is a 82 year old male with past medical history significant for coronary disease, hypertension, hyperlipidemia, GERD, paroxysmal atrial fibrillation, bladder cancer, lung cancer metastatic to brain Mulugeta Castillo is a 82 year old male with past medical history significant for coronary disease, hypertension, hyperlipidemia,  GERD, paroxysmal atrial fibrillation, bladder cancer, lung cancer metastatic to brain, however most recent scan in May show no acute brain abnormalities     Prognosis, Goals, or Advance Care Planning was addressed today with: Yes.  Mood, coping, and/or meaning in the context of serious illness were addressed today: Yes.              Interval History:     Chart review/discussion with unit or clinical team members:   Course reviewed. No significant events overnight and no new medical concerns today.     Per patient or family/caregivers today:  No acute complaints. Hoping to discharge today, back home.             Review of Systems:     Besides above, an additional  system ROS was reviewed and is unremarkable               Physical Exam:   Temp: 97.7  F (36.5  C) Temp src: Oral Temp  Min: 97.7  F (36.5  C)  Max: 97.8  F (36.6  C) BP: (!) 159/68 Pulse: 101   Resp: 16 SpO2: 92 % O2 Device: None (Room air) Oxygen Delivery: 1/2 LPM  Vital Signs with Ranges  Temp:  [97.7  F (36.5  C)-97.8  F (36.6  C)] 97.7  F (36.5  C)  Pulse:  [] 101  Resp:  [16-18] 16  BP: (104-159)/(41-74) 159/68  SpO2:  [90 %-93 %] 92 %  227 lbs 4.71 oz    Physical Exam  Vitals and nursing note reviewed.   Constitutional:       General: He is not in acute distress.  HENT:      Head: Normocephalic.      Mouth/Throat:      Mouth: Mucous membranes are moist.      Pharynx: Oropharynx is clear.   Eyes:      Extraocular Movements: Extraocular movements intact.      Conjunctiva/sclera: Conjunctivae normal.      Pupils: Pupils are equal, round, and reactive to light.   Cardiovascular:      Rate and Rhythm: Normal rate and regular rhythm.      Pulses: Normal pulses.   Pulmonary:      Effort: Pulmonary effort is normal. No respiratory distress.      Comments: Productive cough with white/yellow sputum   Abdominal:      General: Abdomen is flat. There is no distension.      Tenderness: There is no abdominal tenderness.   Musculoskeletal:         General:  Normal range of motion.   Skin:     General: Skin is warm and dry.      Capillary Refill: Capillary refill takes less than 2 seconds.   Neurological:      General: No focal deficit present.      Mental Status: He is alert. Mental status is at baseline.   Psychiatric:         Mood and Affect: Mood normal.         Thought Content: Thought content normal.                  Current Problem List:   Principal Problem:    Encephalopathy      Allergies   Allergen Reactions     Diltiazem Rash     Other reaction(s): Blistering rash  Skin peeling  Other reaction(s): Blistering rash  Skin peeling       Eliquis [Apixaban] Other (See Comments)     Nose bleeds and rash     Doxycycline Unknown and Rash     Rapid heart beat ????  Rapid heart beat ????       Tetracycline Rash            Data Reviewed:       Data   Results for orders placed or performed during the hospital encounter of 06/25/23 (from the past 24 hour(s))   Creatinine   Result Value Ref Range    Creatinine 0.84 0.67 - 1.17 mg/dL    GFR Estimate 87 >60 mL/min/1.73m2   Extra Tube    Narrative    The following orders were created for panel order Extra Tube.  Procedure                               Abnormality         Status                     ---------                               -----------         ------                     Extra Red Top Tube[[id number]]                               In process                   Please view results for these tests on the individual orders.   *      -----------------------------------------------------------------------------------------------------------------          ====================================================  TT: I have personally spent a total of 45 minutes on the date of the encounter,  on the unit in review of medical record, consultation with the medical providers and assessment of Mulugeta Castillo  today, with more than 50% of this time spent in counseling, coordination of care, and discussion re: diagnostic results,  prognosis, symptom management, risks and benefits of management options, and development of plan of care as noted above.      ====================================================

## 2023-06-29 NOTE — PLAN OF CARE
A/O x4. IV removed. Port hep locked- removed. Discharge instructions gone over and understanding verbalized. Meds given to patient to take home. Oxygen delivered to patient to take home. Wife, Leyda, to transport home.

## 2023-06-29 NOTE — CONSULTS
Care Management Discharge Note    Discharge Date: 06/29/2023       Discharge Disposition: Outpatient Rehab (PT, OT, SLP, Cardiac or Pulmonary)    Discharge Services:      Discharge DME: Oxygen    Discharge Transportation:  Family    Private pay costs discussed: Not applicable    Does the patient's insurance plan have a 3 day qualifying hospital stay waiver?  No    Education Provided on the Discharge Plan:  Yes  Persons Notified of Discharge Plans: Provider, pt ,spouse, [facility]  Patient/Family in Agreement with the Plan: yes    Handoff Referral Completed: No    Additional Information:  CM consulted for discharge planning. Pt admitted with acute hypoxic respiratory failure due to lung cancer/Suspected post-obstructive pneumonia. Met with pt and spouse at bedside. Pt has home care PT and OT ordered. Pt is not homebound and would be interested in OP therapy. Requested MD order OP PT and OT. Call to [facility] in [place] [phone]. They requested orders be faxed to [phone] and they will call pt to set up appointments.  Pt is discharging home with O2 which is being arranged through [facility].  Please call if additional discharge needs arise.    Praveena Bautista RN   Inpatient Care Coordination  [facility]   Phone: [phone]

## 2023-06-29 NOTE — PLAN OF CARE
Goal Outcome Evaluation:      Plan of Care Reviewed With: patient, family    Overall Patient Progress: improvingOverall Patient Progress: improving     Temp: 97.8  F (36.6  C) Temp src: Oral BP: 120/74 Pulse: 79   Resp: 16 SpO2: 90 % O2 Device: Nasal cannula Oxygen Delivery: 1 LPM     Pt A/O x2, disoriented to time and situation. C/o pain at left flank, relieved w/ PRN Tylenol. On 0.5L of O2 NC. Up 1-2A GB and WK. Inc of stool and urine, external catheter in place. Updated the night nurse to give stool softener for pt. Pt takes pills whole 1 at a time. Left PIV saline locked. IV Zosyn Q6H in right port. Will continue POC.

## 2023-06-29 NOTE — PROGRESS NOTES
Patient has been assessed for Home Oxygen needs. Oxygen readings:    *Pulse oximetry (SpO2) = 94% on room air at rest while awake.    *SpO2 improved to 96% on 1liters/minute at rest.    *SpO2 = 86% on room air during activity/with exercise.    *SpO2 improved to 90 or Above% on 1liters/minute during activity/with exercise.

## 2023-06-29 NOTE — DISCHARGE SUMMARY
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     Mulugeta Castillo is a 82 year old male came to attention on 6/25/2023 due to increased somnolence.    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  He has been on multiple chemotherapeutic agents and most recently started on Zepzelca just a couple of days ago prior to admission.  It was note that Ms. Castillo reports that the patient had been remarkably alert and active until he became confused and somnolent.      He initially presented to the [facility] emergency department where he was assessed and found to be hypoxic with a significantly white cell count, acute kidney injury and hyperkalemia.  He was treated for the hyperkalemia and started on broad-spectrum antibiotics after blood and urine cultures were obtained.  [facility], the patient was admitted to [facility].        Diagnoses:  1.  Acute hypoxic respiratory failure due to lung cancer/Suspected post-obstructive pneumonia.  Procal elevated to 7.95. CT Chest report equivocal.  Patient has been on antibiotics with cefepime and switched to Zosyn in case cefepime is contributing to confusion.  Patient  seems to be doing better but still producing  some yellow sputum.    He was treated with Zosyn and then switched to Augmentin at the time of discharge.  Patient was requiring ongoing oxygen supplementation to keep his saturations greater than 88%.  We will go home on 2 L of oxygen and given his ongoing lung cancer I suspect he is going to need this lifelong.  When the patient was seen by physical therapy he dropped his oxygen saturations down to 77% with walking the halls.  Below is at the time of discharge per nursing:  Patient has been assessed for Home Oxygen needs. Oxygen readings:  *Pulse oximetry (SpO2) = 94% on room air at rest while awake.  *SpO2 improved to 96% on 1liters/minute at rest.  *SpO2 = 86% on room air during activity/with exercise.  *SpO2 improved to 90 or Above% on 1liters/minute during activity/with exercise.    2.  Toxic/metabolic encephalopathy with hallucinations/behavior issues.  Patient has had confusion while at the hospital starting due to his pneumonia, renal failure, and medications.  He was treated with Zyprexa and Seroquel.  Over time he was also given Haldol.  As the patient's symptoms and processes resolved so did his confusion so he was able to be taken off of Zyprexa, Seroquel, and Haldol.  Mr. Castillo was fully awake and fully appropriate this am at the time of discharge.  Only having some memory recall issues but otherwise seems to be getting close to his baseline.  This point no need for brain imaging.  He had a virtually normal MRI of his brain on 5/5/23.  (His wife brought the report today.)  3.  Acute renal failure with hyperkalemia.  The high K was managed while patient was at the [place] emergency department.  EDITH is common with the Zepzelka which the patient just started the week prior to his admission.  Patient's creatinine is down to 0.95.  After he had a peak of 1.44.  Suspect that this was due to being prerenal along with his recent Zepzelka.  4.  Chronic left-sided  "chest pain associated with left-sided lung cancer for which the patient is on buprenorphine and pregabablin.  Given the patient's confusion could consider dropping the dose but the patient seems to be doing better so we will continue this for now.  5.    Persistent atrial fibrillation for which patient is on amiodarone as well as Lopressor for rate control.  Patient is not on any anticoagulation.  This may be due to thrombocytopenia related to treatment for his lung cancer.  6.  EDITH with hyperkalemia.  Resolved.   7.  Marked leukocytosis.  Due to exposure to G-CSF which was just administered on Saturday.  Trending toward normal.   8.  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  Previous gamma knife surgery for brain metastases.  Patient's biggest goal is to get back to his condo in [facility] to visit.  Unclear if the patient is going to be able to do this.  9.  Hypertension: On Lopressor  10. Hx bladder cancer, not currently in treatment.   11.  Edema: Is on Lasix  12.  BPH: Is on Proscar          Clinically Significant Risk Factors     # Obesity: Estimated body mass index is 31.7 kg/m  as calculated from the following:    Height as of this encounter: 1.803 m (5' 11\").    Weight as of this encounter: 103.1 kg (227 lb 4.7 oz).       Significant Results and Procedures   Most Recent 3 CBC's:Recent Labs   Lab Test 06/27/23  0655 06/26/23  0652 06/26/23  0126   WBC 58.1* 77.4* 84.4*   HGB 8.2* 8.3* 8.3*   * 123* 117*    163 150     Most Recent 3 BMP's:Recent Labs   Lab Test 06/29/23  0651 06/27/23  0655 06/26/23  1845 06/26/23  0811 06/26/23  0652   NA  --  140 137  --  137   POTASSIUM  --  4.9 5.0 5.0 5.1   CHLORIDE  --  103 101  --  102   CO2  --  31* 29  --  27   BUN  --  19.2 23.3*  --  27.1*   CR 0.84 0.95 1.17  --  1.29*   ANIONGAP  --  6* 7  --  8   NUPUR  --  9.4 9.4  --  9.3   GLC  --  145* 144*  --  113*   ,   Results for orders placed or performed during the hospital encounter of " 01/10/22   MR Brain w/o & w Contrast    Narrative     MR BRAIN W/O & W CONTRAST 1/10/2022 2:39 PM    Impression:  1. Decreased size of the left basal ganglia lesion which is only  faintly visualized on today's study.  2. 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  3. Left occipital lesion is not visualized on today's study.            4Ms:  Polypharmacy: Medications reviewed, would suggest getting rid of multiple nutritional supplements, vitamins, etc.  We will leave this to his primary provider  Mentation:  SLUMS N/A,  BIMS N/A,  Capacity intact  Social support: Patient lives at home with his wife  Mobility: Uses a walker to get around  What is importmant: Really would like to get back to [facility] to visit    Follow up/instructions: We will follow-up with his oncologist/primary care provider for ongoing treatment of his lung cancer and to make sure that his hypoxia continues to be treated    Pending test results at discharge: None    Unresulted Labs Ordered in the Past 30 Days of this Admission     No orders found from 5/26/2023 to 6/26/2023.          Discharge Orders      Home Care Referral      Follow-up and recommended labs and tests     Follow up with primary care provider, Roopa Hong, within 7 days for hospital follow- up.  The following labs/tests are recommended: ongoing evaluation for hypoxia and need for oxygen.     Activity    Your activity upon discharge: activity as tolerated and no driving     Reason for your hospital stay    Confusion, pneumonia, hypoxia     Oxygen Adult/Peds    Dropped to 77% with activity with PT  Per nursing  Patient has been assessed for Home Oxygen needs. Oxygen readings:  *Pulse oximetry (SpO2) = 94% on room air at rest while awake.  *SpO2 improved to 96% on 1liters/minute at rest.  *SpO2 = 86% on room air during activity/with exercise.  *SpO2 improved to 90 or Above% on 1liters/minute during activity/with exercise.    Oxygen Documentation  I certify  that this patient, Mulugeta Castillo has been under my care (or a nurse practitioner or physican's assistant working with me). This is the face-to-face encounter for oxygen medical necessity.      At the time of this encounter supplemental oxygen is reasonable and necessary and is expected to improve the patient's condition in a home setting.       Patient has continued oxygen desaturation due to Pneumonia J18.9  Lung cancer.    If portability is ordered, is the patient mobile within the home? yes     Diet    Follow this diet upon discharge: Orders Placed This Encounter      Combination Diet Regular Diet Adult       Discharge Disposition   Discharged to home  Condition at discharge: Stable      Consultations This Hospital Stay   PHARMACY TO DOSE VANCO  INFECTIOUS DISEASES IP CONSULT  VASCULAR ACCESS ADULT IP CONSULT  PALLIATIVE CARE ADULT IP CONSULT  PHYSICAL THERAPY ADULT IP CONSULT  OCCUPATIONAL THERAPY ADULT IP CONSULT  VASCULAR ACCESS ADULT IP CONSULT    Code Status   No CPR- Do NOT Intubate    Time Spent on this Encounter   I, Jackson Heredia MD, personally saw the patient today and spent greater than 30 minutes discharging this patient.          This document was created using voice recognition technology.  Please excuse any typographical errors that may have occurred.  Please call with any questions.       Jackson Heredia MD  [place] [address] [facility]  [address] [place] [address]  Phone: [phone]  Fax: [phone]  ______________________________________________________________________    Physical Exam   Vital Signs: Temp: 97.7  F (36.5  C) Temp src: Oral BP: (!) 159/68 Pulse: 101   Resp: 16 SpO2: 92 % O2 Device: None (Room air) Oxygen Delivery: 1/2 LPM  Weight: 227 lbs 4.71 oz    Constitutional: Alert and oriented x3 which is improved.  He seemed more interactive, alert and appropriate.    Seems to be at baseline, wearing nasal cannula  HEENT: MMM   Respiratory:  Rhonchorous breathing noted particularly on the right.  Decreased breath sounds on the left, good air entry.  No current crackles nor wheezing.  Moderate air movement  Cardiovascular: regular rate and rhythm and no murmur noted  GI: Soft, nontender, not distended, bowel sounds present throughout   Musculoskeletal: bilat LE edema 2+ bilateral lower extremity up to the knees which is stable if not even a little improved today         Discharge Medications   Current Discharge Medication List      START taking these medications    Details   amoxicillin-clavulanate (AUGMENTIN) 875-125 MG tablet Take 1 tablet by mouth every 12 hours for 5 days  Qty: 10 tablet, Refills: 0    Associated Diagnoses: Pneumonia due to infectious organism, unspecified laterality, unspecified part of lung      ipratropium-albuterol (COMBIVENT RESPIMAT)  MCG/ACT inhaler Inhale 1 puff into the lungs 4 times daily as needed for wheezing  Qty: 1 each, Refills: 0    Associated Diagnoses: Pneumonia due to infectious organism, unspecified laterality, unspecified part of lung; Lung mass         CONTINUE these medications which have CHANGED    Details   metoprolol tartrate (LOPRESSOR) 25 MG tablet Take 0.5 tablets (12.5 mg) by mouth 2 times daily for 90 days  Qty: 90 tablet, Refills: 0    Associated Diagnoses: Essential hypertension         CONTINUE these medications which have NOT CHANGED    Details   acetaminophen (TYLENOL) 500 MG tablet Take 1,000 mg by mouth every 6 hours as needed      albuterol (PROAIR HFA/PROVENTIL HFA/VENTOLIN HFA) 108 (90 Base) MCG/ACT inhaler Inhale 2 puffs into the lungs    Comments: Pharmacy may dispense brand covered by insurance (Proair, or proventil or ventolin or generic albuterol inhaler)      amiodarone (PACERONE) 200 MG tablet Take 200 mg by mouth daily      aspirin 81 MG EC tablet Take 81 mg by mouth daily      buprenorphine (BUTRANS) 5 MCG/HR WK patch Place 1 patch onto the skin every 7 days on Fridays       carboxymethylcellulose PF (REFRESH PLUS) 0.5 % ophthalmic solution Place 1 drop into both eyes 4 times daily as needed for dry eyes      celecoxib (CELEBREX) 100 MG capsule Take 100 mg by mouth 2 times daily as needed for pain      diclofenac (VOLTAREN) 1 % topical gel Apply 4 g topically 4 times daily as needed for moderate pain to left side/back      finasteride (PROSCAR) 5 MG tablet Take 5 mg by mouth daily      fish oil-omega-3 fatty acids 1000 MG capsule Take 1 capsule by mouth daily      folic acid (FOLVITE) 400 MCG tablet Take 400 mcg by mouth daily      furosemide (LASIX) 20 MG tablet Take 20 mg by mouth daily      Garlic Oil 1000 MG CAPS Take 1 capsule by mouth daily      lurbinectedin (ZEPZELCA) 4 MG injection Inject into the vein once      Magnesium Oxide 420 MG TABS Take 1 tablet by mouth 3 times daily      methocarbamol (ROBAXIN) 750 MG tablet Take 750 mg by mouth 3 times daily as needed for muscle spasms      methylphenidate (RITALIN) 5 MG tablet Take 5 mg by mouth every morning      omeprazole (PRILOSEC) 20 MG DR capsule Take 20 mg by mouth daily      ondansetron (ZOFRAN) 8 MG tablet Take 2 tablets (16mg) by mouth every day for 2 days starting the day after chemotherapy to prevent nausea and vomiting.      pegfilgrastim-bmez (ZIEXTENZO) 6 MG/0.6ML injection Inject 6 mg Subcutaneous once      pregabalin (LYRICA) 150 MG capsule Take 150 mg by mouth daily      prochlorperazine (COMPAZINE) 10 MG tablet Take 10 mg by mouth every 6 hours as needed for nausea or vomiting      tamsulosin (FLOMAX) 0.4 MG capsule Take 0.4 mg by mouth every evening      vitamin B-12 (CYANOCOBALAMIN) 100 MCG tablet Take 1,000 mcg by mouth daily      vitamin C (ASCORBIC ACID) 500 MG tablet Take 500 mg by mouth daily      vitamin D3 (CHOLECALCIFEROL) 50 mcg (2000 units) tablet Take 1 tablet by mouth daily      zinc gluconate 50 MG tablet Take 50 mg by mouth daily         STOP taking these medications       benzonatate (TESSALON)  100 MG capsule Comments:   Reason for Stopping:         Magnesium 500 MG CAPS Comments:   Reason for Stopping:         senna (SENOKOT) 8.6 MG tablet Comments:   Reason for Stopping:             Allergies   Allergies   Allergen Reactions     Diltiazem Rash     Other reaction(s): Blistering rash  Skin peeling  Other reaction(s): Blistering rash  Skin peeling       Eliquis [Apixaban] Other (See Comments)     Nose bleeds and rash     Doxycycline Unknown and Rash     Rapid heart beat ????  Rapid heart beat ????       Tetracycline Rash

## 2023-06-29 NOTE — PROGRESS NOTES
[facility]  Infectious Disease Progress Note          Assessment and Plan:   Assessment & Plan  Mulugeta Castillo is a 82 year old male who was admitted on 6/25/2023.      Impression: 1 82-year-old male with underlying lung cancer, [facility] in [facility], presents with recurrent rigors over the last couple of days, multiple signs of probable infection, blood cultures so far negative in the [facility] system other clear focal infection site  2 lung cancer on chemotherapy with a port in place, major leukemoid reaction did get a dose of Neupogen  3 acute renal insufficiency  4 prior bladder cancer     REC 1  improved, diagnosis not clear, okay disposition from my standpoint .  Agree with course of Augmentin as ordered by Dr. Saldana              Interval History:   no new complaints confused, required meds during the night, very sleepy now.  No focal pain site.  Temp is down Paige blood cultures are negative no new localizing symptoms ongoing leukemoid reaction presently due to Neupogen              Medications:       amiodarone  200 mg Oral Daily     amoxicillin-clavulanate  1 tablet Oral Q12H ASIM (08/20)     aspirin  81 mg Oral Daily     [START ON 6/30/2023] buprenorphine  1 patch Transdermal Weekly    And     buprenorphine   Transdermal Q8H ASIM     finasteride  5 mg Oral Daily     folic acid  400 mcg Oral Daily     furosemide  20 mg Oral Daily     heparin  5-10 mL Intracatheter Q28 Days     heparin lock flush  5-10 mL Intracatheter Q24H     magnesium oxide  400 mg Oral TID     methylphenidate  5 mg Oral QAM     metoprolol tartrate  12.5 mg Oral BID     pantoprazole  40 mg Oral QAM AC     pregabalin  100 mg Oral Daily     [Held by provider] pregabalin  150 mg Oral Daily     QUEtiapine  12.5 mg Oral QPM     sodium chloride (PF)  10-20 mL Intracatheter Q28 Days     sodium chloride (PF)  3 mL Intracatheter Q8H     tamsulosin  0.4 mg Oral QPM                  Physical Exam:   Blood pressure (!)  "159/68, pulse 101, temperature 97.7  F (36.5  C), temperature source Oral, resp. rate 16, height 1.803 m (5' 11\"), weight 103.1 kg (227 lb 4.7 oz), SpO2 92 %.  Wt Readings from Last 2 Encounters:   06/29/23 103.1 kg (227 lb 4.7 oz)   01/10/22 94.9 kg (209 lb 4.8 oz)     Vital Signs with Ranges  Temp:  [97.7  F (36.5  C)-97.8  F (36.6  C)] 97.7  F (36.5  C)  Pulse:  [] 101  Resp:  [16-18] 16  BP: (104-159)/(41-74) 159/68  SpO2:  [90 %-93 %] 92 %    Constitutional: sleepy, no apparent distress looks chronically ill   Lungs: Clear to auscultation bilaterally, no crackles or wheezing   Cardiovascular: Regular rate and rhythm, normal S1 and S2, and no murmur noted   Abdomen: Normal bowel sounds, soft, non-distended, non-tender   Skin: No rashes, no cyanosis, some edema   Other:           Data:   All microbiology laboratory data reviewed.  Recent Labs   Lab Test 06/27/23  0655 06/26/23  0652 06/26/23  0126   WBC 58.1* 77.4* 84.4*   HGB 8.2* 8.3* 8.3*   HCT 26.3* 27.6* 25.5*   * 123* 117*    163 150     Recent Labs   Lab Test 06/29/23  0651 06/27/23  0655 06/26/23  1845   CR 0.84 0.95 1.17     No lab results found.  No lab results found.    Invalid input(s):     "

## 2023-06-29 NOTE — PLAN OF CARE
Physical Therapy Discharge Summary     Reason for therapy discharge:    Discharged to home with OP therapy.     Progress towards therapy goal(s). See goals on Care Plan in [facility] electronic health record for goal details.  Goals not met.  Barriers to achieving goals:   discharge from facility.     Therapy recommendation(s):    Continued therapy is recommended.  Rationale/Recommendations:  Patient would benefit from continued PT in order to increase strength, activity tolerance and independence with mobility.      **Pt not seen by discharging therapist on this date, note written based on previous treating therapist's notes and recommendations

## 2023-06-29 NOTE — PLAN OF CARE
"/74 (BP Location: Right arm)   Pulse 79   Temp 97.8  F (36.6  C) (Oral)   Resp 16   Ht 1.803 m (5' 11\")   Wt 103.1 kg (227 lb 4.7 oz)   SpO2 90%   BMI 31.70 kg/m       Orientation: Aox3, disorientated to situation   Neuro:q4h, Alert, intact  Pain status: reports pain to L flank, po prn pain medication given   Resp: 0.5L NC, MANZANO, crackles/diminished  Cardiac: wdl  GI: Constipation, gave prn senna  : incontinent, external cath, voiding  Skin: Edema to BL ankles/feet and BU hands. shear to coccyx   LDAS: Port to R-chest- hep locked, R-PIV SL  Pertinent Labs/Imaging:   Diet: regular diet  Activity: 1, GB, W  Alarms/Safety: Bed alarm, Seizure precautions :   Discharge Date: 6/29/23  Vascular consult needed for port, will notify oncoming nurse    Patient resting comfortably in bed, will continue to care round and monitor          "

## 2023-06-29 NOTE — DISCHARGE INSTRUCTIONS
Referral and orders for PT and OT have been sent to [facility] in [place], they will call to set up appointments. Their contact number is [phone].

## 2023-06-30 NOTE — PLAN OF CARE
"Occupational Therapy Discharge Summary    Reason for therapy discharge:    Discharged to home with outpatient therapy.    Progress towards therapy goal(s). See goals on Care Plan in [facility] electronic health record for goal details.  Goals partially met.  Barriers to achieving goals:   discharge from facility.    Therapy recommendation(s):    Continued therapy is recommended.  Rationale/Recommendations:  Per last treating OT \"Appears pt is nearing baseline, with continued medical management and inpatient OT, anticipate pt would be able to return home with assist with higher level cognitive tasks including medication management. homebound and rec HH OT  for safety screen and ADL/IADL indp in the home.\"      **Pt not seen by discharging therapist on this date, note written based on previous treating therapist's notes and recommendations       "

## (undated) DEVICE — ENDO BRUSH CYTOLOGY SUPERDIMENSION NDL TIP

## (undated) DEVICE — PACK ENDOSCOPY GI CUSTOM UMMC

## (undated) DEVICE — MARKER VISICOIL LINEAR FIDUCIAL 1CM FP-0015

## (undated) DEVICE — LUBRICANT INST KIT ENDO-LUBE 220-90

## (undated) DEVICE — KIT ENDO FIRST STEP DISINFECTANT 200ML W/POUCH EP-4

## (undated) DEVICE — ENDO VALVE BX EVIS MAJ-210

## (undated) DEVICE — ENDO VALVE SYR NDL KIT ULTRASOUND BRONCH NA-201SX-4022-A

## (undated) DEVICE — ENDO ADPT BRONCH SWIVEL PORTEX UNSTERILE

## (undated) DEVICE — ENDO NDL ASPIRATION ULTRASOUND 22GA ACQUIRE M00555540

## (undated) DEVICE — ENDO VALVE SUCTION ULTRASOUND BRONCH MAJ-1414

## (undated) DEVICE — SYR 30ML SLIP TIP W/O NDL 302833

## (undated) DEVICE — ENDO FORCEP ENDOJAW BIOPSY 2.0MMX155CM FB-221K

## (undated) DEVICE — ENDO VALVE SUCTION BRONCH EVIS MAJ-209

## (undated) DEVICE — ENDO ADPT BRONCH SWIVEL Y A1002

## (undated) DEVICE — SOL NACL 0.9% IRRIG 1000ML BOTTLE 2F7124

## (undated) DEVICE — PITCHER STERILE 1000ML  SSK9004A

## (undated) DEVICE — CONNECTOR STOPCOCK 3 WAY MALE LL HI-FLO MX9311L

## (undated) DEVICE — KIT SUPERDIMENSION LOCATOR GUIDE W/COMPONENTS

## (undated) DEVICE — SPONGE RAY-TEC 4X8" 7318

## (undated) DEVICE — TUBING SUCTION 10'X3/16" N510

## (undated) DEVICE — PATCH SUPERDIMENSION PATIENT SENSOR LG AKI00131-01

## (undated) DEVICE — GUIDE SUPERDIMENSION LOCATABLE EDGE SD300LG

## (undated) DEVICE — KIT SUPERDIMESION PROCEDURE EDGE 90 OLYMPUS SDK4900

## (undated) DEVICE — ENDO BRUSH CYTOLOGY 2.0MMX10MM 115CM BRONCH BC-202D-2010

## (undated) DEVICE — ENDO PROBE COVER ULTRASOUND BALLOON LATEX  MAJ-249

## (undated) DEVICE — PACK ENT ENDOSCOPY UMMC

## (undated) DEVICE — SYR 50ML LL W/O NDL 309653

## (undated) DEVICE — SYR 10ML SLIP TIP W/O NDL 303134

## (undated) DEVICE — DRSG TELFA 3X8" 1238

## (undated) DEVICE — SUCTION MANIFOLD NEPTUNE 2 SYS 4 PORT 0702-020-000

## (undated) DEVICE — TRACKER UNIT VPAD INS-0050

## (undated) DEVICE — ADAPTER BRONCHOSCOPE EDGE SUPERDIMENSION SD300BA

## (undated) RX ORDER — ESMOLOL HYDROCHLORIDE 10 MG/ML
INJECTION INTRAVENOUS
Status: DISPENSED
Start: 2021-03-31

## (undated) RX ORDER — LIDOCAINE HYDROCHLORIDE 20 MG/ML
INJECTION, SOLUTION EPIDURAL; INFILTRATION; INTRACAUDAL; PERINEURAL
Status: DISPENSED
Start: 2021-03-31

## (undated) RX ORDER — FENTANYL CITRATE 50 UG/ML
INJECTION, SOLUTION INTRAMUSCULAR; INTRAVENOUS
Status: DISPENSED
Start: 2021-03-31

## (undated) RX ORDER — PROPOFOL 10 MG/ML
INJECTION, EMULSION INTRAVENOUS
Status: DISPENSED
Start: 2021-03-31

## (undated) RX ORDER — HYDROMORPHONE HYDROCHLORIDE 1 MG/ML
INJECTION, SOLUTION INTRAMUSCULAR; INTRAVENOUS; SUBCUTANEOUS
Status: DISPENSED
Start: 2021-03-31

## (undated) RX ORDER — OXYCODONE HYDROCHLORIDE 5 MG/1
TABLET ORAL
Status: DISPENSED
Start: 2021-03-31

## (undated) RX ORDER — FENTANYL CITRATE 50 UG/ML
INJECTION, SOLUTION INTRAMUSCULAR; INTRAVENOUS
Status: DISPENSED
Start: 2021-03-25

## (undated) RX ORDER — ONDANSETRON 2 MG/ML
INJECTION INTRAMUSCULAR; INTRAVENOUS
Status: DISPENSED
Start: 2021-03-31

## (undated) RX ORDER — METOPROLOL TARTRATE 1 MG/ML
INJECTION, SOLUTION INTRAVENOUS
Status: DISPENSED
Start: 2021-03-31